# Patient Record
Sex: FEMALE | Race: WHITE | Employment: FULL TIME | URBAN - METROPOLITAN AREA
[De-identification: names, ages, dates, MRNs, and addresses within clinical notes are randomized per-mention and may not be internally consistent; named-entity substitution may affect disease eponyms.]

---

## 2017-05-17 ENCOUNTER — APPOINTMENT (OUTPATIENT)
Dept: CT IMAGING | Age: 32
End: 2017-05-17
Attending: FAMILY MEDICINE
Payer: COMMERCIAL

## 2017-05-17 ENCOUNTER — HOSPITAL ENCOUNTER (OUTPATIENT)
Age: 32
Discharge: HOME | End: 2017-05-17
Attending: FAMILY MEDICINE
Payer: COMMERCIAL

## 2017-05-17 VITALS
HEIGHT: 69 IN | WEIGHT: 240 LBS | RESPIRATION RATE: 18 BRPM | OXYGEN SATURATION: 97 % | TEMPERATURE: 98 F | BODY MASS INDEX: 35.55 KG/M2 | SYSTOLIC BLOOD PRESSURE: 117 MMHG | DIASTOLIC BLOOD PRESSURE: 73 MMHG | HEART RATE: 68 BPM

## 2017-05-17 DIAGNOSIS — R11.0 NAUSEA: ICD-10-CM

## 2017-05-17 DIAGNOSIS — R10.9 RIGHT SIDED ABDOMINAL PAIN: Primary | ICD-10-CM

## 2017-05-17 PROCEDURE — 36415 COLL VENOUS BLD VENIPUNCTURE: CPT

## 2017-05-17 PROCEDURE — 99204 OFFICE O/P NEW MOD 45 MIN: CPT

## 2017-05-17 PROCEDURE — 80047 BASIC METABLC PNL IONIZED CA: CPT

## 2017-05-17 PROCEDURE — 74177 CT ABD & PELVIS W/CONTRAST: CPT | Performed by: FAMILY MEDICINE

## 2017-05-17 PROCEDURE — 81002 URINALYSIS NONAUTO W/O SCOPE: CPT | Performed by: FAMILY MEDICINE

## 2017-05-17 PROCEDURE — 87086 URINE CULTURE/COLONY COUNT: CPT | Performed by: FAMILY MEDICINE

## 2017-05-17 PROCEDURE — 99214 OFFICE O/P EST MOD 30 MIN: CPT

## 2017-05-17 PROCEDURE — 81025 URINE PREGNANCY TEST: CPT | Performed by: FAMILY MEDICINE

## 2017-05-17 PROCEDURE — 85025 COMPLETE CBC W/AUTO DIFF WBC: CPT | Performed by: FAMILY MEDICINE

## 2017-05-17 RX ORDER — DICYCLOMINE HCL 20 MG
20 TABLET ORAL 4 TIMES DAILY PRN
Qty: 30 TABLET | Refills: 0 | Status: SHIPPED | OUTPATIENT
Start: 2017-05-17 | End: 2017-06-16

## 2017-05-17 RX ORDER — ONDANSETRON 8 MG/1
8 TABLET, ORALLY DISINTEGRATING ORAL EVERY 12 HOURS PRN
Qty: 10 TABLET | Refills: 0 | Status: SHIPPED | OUTPATIENT
Start: 2017-05-17 | End: 2017-05-27

## 2017-05-17 NOTE — ED INITIAL ASSESSMENT (HPI)
Sharp stomach pain to right side started today around 10am. Some nausea. No diarrhea, no fever. Normal BM last night.

## 2017-05-17 NOTE — ED PROVIDER NOTES
Patient Seen in: [address] [facility] [place]    History   Patient presents with:  Abdominal Pain    Stated Complaint: AB PAIN    HPI    52-year-old female presents to the clinic today with chief complaints of right upper quadrant and points to [place] Take 10 mg by mouth daily.    FREEYLE LANCETS Does not apply Misc,  As directed       Family History   Problem Relation Age of Onset   • Heart Disease Other      Grandfather   • Hypertension Other      Grandfather   • Hypertension Mother    • Diabetes Oth Normoactive bowel sounds. . Guarding : no. Rigidity: no. Percussion : Normal to percussion  RECTAL: Exam not done.   MUSCULOSKELETAL: back is not tender,FROM of the back  EXTREMITIES: no cyanosis, clubbing or edema  NEURO: Oriented times three, cranial nerve of the abdomen and pelvis.     Dictated by: Tabatha Becker MD on 5/17/2017 at 15:55     Approved by: Tabatha Becker MD           MDM     28-year-old female presenting with right-sided abdominal pain that started this morning around 10 AM.  Multiple diagno

## 2017-06-02 ENCOUNTER — LAB ENCOUNTER (OUTPATIENT)
Dept: LAB | Age: 32
End: 2017-06-02
Attending: FAMILY MEDICINE
Payer: COMMERCIAL

## 2017-06-02 DIAGNOSIS — E11.9 DIABETES MELLITUS (HCC): Primary | ICD-10-CM

## 2017-06-02 DIAGNOSIS — E78.00 PURE HYPERCHOLESTEROLEMIA: ICD-10-CM

## 2017-06-02 PROCEDURE — 84443 ASSAY THYROID STIM HORMONE: CPT

## 2017-06-02 PROCEDURE — 82043 UR ALBUMIN QUANTITATIVE: CPT

## 2017-06-02 PROCEDURE — 80061 LIPID PANEL: CPT

## 2017-06-02 PROCEDURE — 36415 COLL VENOUS BLD VENIPUNCTURE: CPT

## 2017-06-02 PROCEDURE — 80053 COMPREHEN METABOLIC PANEL: CPT

## 2017-06-02 PROCEDURE — 82570 ASSAY OF URINE CREATININE: CPT

## 2017-06-28 ENCOUNTER — TELEPHONE (OUTPATIENT)
Dept: FAMILY MEDICINE CLINIC | Facility: CLINIC | Age: 32
End: 2017-06-28

## 2017-06-28 ENCOUNTER — OFFICE VISIT (OUTPATIENT)
Dept: FAMILY MEDICINE CLINIC | Facility: CLINIC | Age: 32
End: 2017-06-28

## 2017-06-28 VITALS
TEMPERATURE: 98 F | WEIGHT: 241 LBS | BODY MASS INDEX: 36 KG/M2 | DIASTOLIC BLOOD PRESSURE: 82 MMHG | SYSTOLIC BLOOD PRESSURE: 122 MMHG

## 2017-06-28 DIAGNOSIS — H61.22 HEARING LOSS DUE TO CERUMEN IMPACTION, LEFT: Primary | ICD-10-CM

## 2017-06-28 DIAGNOSIS — H66.002 ACUTE SUPPURATIVE OTITIS MEDIA OF LEFT EAR WITHOUT SPONTANEOUS RUPTURE OF TYMPANIC MEMBRANE, RECURRENCE NOT SPECIFIED: ICD-10-CM

## 2017-06-28 DIAGNOSIS — H60.392 OTHER INFECTIVE ACUTE OTITIS EXTERNA OF LEFT EAR: ICD-10-CM

## 2017-06-28 DIAGNOSIS — E11.9 TYPE 2 DIABETES MELLITUS WITHOUT COMPLICATION, UNSPECIFIED LONG TERM INSULIN USE STATUS: ICD-10-CM

## 2017-06-28 PROBLEM — H60.502 ACUTE OTITIS EXTERNA OF LEFT EAR: Status: ACTIVE | Noted: 2017-06-28

## 2017-06-28 PROCEDURE — 69210 REMOVE IMPACTED EAR WAX UNI: CPT | Performed by: FAMILY MEDICINE

## 2017-06-28 PROCEDURE — 99213 OFFICE O/P EST LOW 20 MIN: CPT | Performed by: FAMILY MEDICINE

## 2017-06-28 RX ORDER — AMOXICILLIN AND CLAVULANATE POTASSIUM 875; 125 MG/1; MG/1
1 TABLET, FILM COATED ORAL 2 TIMES DAILY
Qty: 20 TABLET | Refills: 0 | Status: SHIPPED | OUTPATIENT
Start: 2017-06-28 | End: 2017-07-08

## 2017-06-28 RX ORDER — ACETIC ACID 20.65 MG/ML
4 SOLUTION AURICULAR (OTIC) 3 TIMES DAILY
Qty: 1 BOTTLE | Refills: 0 | Status: SHIPPED | OUTPATIENT
Start: 2017-06-28 | End: 2017-07-05

## 2017-06-28 NOTE — TELEPHONE ENCOUNTER
JOSE RAFAEL THINKS SHE HAS AN EAR INFECTION, IT HAS BEEN GOING ON FOR A WEEK, AND IT IS HER LEFT EAR. SHE WILL SEE ANYBODY IF DR MILLER CAN'T SEE HER.

## 2017-06-28 NOTE — PATIENT INSTRUCTIONS
Impacted Earwax  Impacted earwax is a buildup of the natural wax in the ear (cerumen). Impacted earwax is very common. It can cause symptoms such as hearing loss. It can also stop a doctor doing an exam of your ear.   Understanding earwax  Tiny glands in · Ringing in the ears  · Cough  Treatment for impacted earwax  If you don’t have symptoms, you may not need treatment. Often the earwax goes away on its own with time. If you have symptoms, you may have 1 or more treatments such as:  · Ear drops.  These hel You have an infection of the middle ear, the space behind the eardrum. This is also called acute otitis media (AOM). Sometimes it is caused by the common cold.  This is because congestion can block the internal passage (eustachian tube) that drains fluid fr

## 2017-06-28 NOTE — TELEPHONE ENCOUNTER
The medication Antipyrine-Benzocaine (AURALGAN) 55-14   Is no longer available. It is not produced an longer.   Please contact pharmacy to prescribe a replacement medication

## 2017-06-28 NOTE — PROGRESS NOTES
HPI:   Magy Sotelo is a 28year old female who presents for upper respiratory symptoms for  2  days. Patient reports ear pain fpr 2 days. No fever. Swimming a lot. Using Qtips. .      Current Outpatient Prescriptions:  Dulaglutide (TRULICITY) 1.5 MG/0. is a 28year old female who presents with    1. Hearing loss due to cerumen impaction, left  - cerumen removed with currette and irrigation    2.  Type 2 diabetes mellitus without complication, unspecified long term insulin use status ([facility] [address]  - followed by ALY

## 2017-06-29 ENCOUNTER — TELEPHONE (OUTPATIENT)
Dept: FAMILY MEDICINE CLINIC | Facility: CLINIC | Age: 32
End: 2017-06-29

## 2017-06-29 NOTE — TELEPHONE ENCOUNTER
Wendy Barlow from [facility] and states they received a red top aerobic culture for patient but anaerobic was ordered. Will need to speak to DS regarding this. Left message will await return phone call.

## 2017-06-30 ENCOUNTER — MED REC SCAN ONLY (OUTPATIENT)
Dept: FAMILY MEDICINE CLINIC | Facility: CLINIC | Age: 32
End: 2017-06-30

## 2017-07-12 ENCOUNTER — OFFICE VISIT (OUTPATIENT)
Dept: FAMILY MEDICINE CLINIC | Facility: CLINIC | Age: 32
End: 2017-07-12

## 2017-07-12 VITALS
TEMPERATURE: 98 F | DIASTOLIC BLOOD PRESSURE: 72 MMHG | WEIGHT: 241 LBS | BODY MASS INDEX: 36 KG/M2 | SYSTOLIC BLOOD PRESSURE: 128 MMHG

## 2017-07-12 DIAGNOSIS — H60.392 OTHER INFECTIVE ACUTE OTITIS EXTERNA OF LEFT EAR: ICD-10-CM

## 2017-07-12 DIAGNOSIS — Z86.69 OTITIS MEDIA RESOLVED: Primary | ICD-10-CM

## 2017-07-12 PROCEDURE — 87102 FUNGUS ISOLATION CULTURE: CPT | Performed by: FAMILY MEDICINE

## 2017-07-12 PROCEDURE — 99213 OFFICE O/P EST LOW 20 MIN: CPT | Performed by: FAMILY MEDICINE

## 2017-07-12 PROCEDURE — 87070 CULTURE OTHR SPECIMN AEROBIC: CPT | Performed by: FAMILY MEDICINE

## 2017-07-12 PROCEDURE — 87107 FUNGI IDENTIFICATION MOLD: CPT | Performed by: FAMILY MEDICINE

## 2017-07-12 PROCEDURE — 87205 SMEAR GRAM STAIN: CPT | Performed by: FAMILY MEDICINE

## 2017-07-12 RX ORDER — TOBRAMYCIN 3 MG/ML
SOLUTION/ DROPS OPHTHALMIC
Qty: 1 BOTTLE | Refills: 0 | Status: SHIPPED | OUTPATIENT
Start: 2017-07-12 | End: 2017-12-29

## 2017-07-12 NOTE — PATIENT INSTRUCTIONS
Otitis media resolved  Culture done  Tobramycin 3 gtt tid x 5 days  Follow up Dr. Fabien Singh 4 gtt QID x 10

## 2017-07-12 NOTE — PROGRESS NOTES
HPI:   Rozina Leong is a 28year old female who presents for follow up on ROM and otitis externa treated with augmentin and vosol ear drops.        Current Outpatient Prescriptions:  Tobramycin Sulfate 0.3 % Ophthalmic Solution 3 drops to left ear qid x RRR without murmur    ASSESSMENT AND PLAN:   Татьяна Romano is a 28year old female who presents with     Otitis media resolved  (primary encounter diagnosis)  Other infective acute otitis externa of left ear    No orders of the defined types were placed

## 2017-12-29 ENCOUNTER — OFFICE VISIT (OUTPATIENT)
Dept: FAMILY MEDICINE CLINIC | Facility: CLINIC | Age: 32
End: 2017-12-29

## 2017-12-29 VITALS
TEMPERATURE: 99 F | HEART RATE: 94 BPM | OXYGEN SATURATION: 99 % | SYSTOLIC BLOOD PRESSURE: 122 MMHG | DIASTOLIC BLOOD PRESSURE: 88 MMHG

## 2017-12-29 DIAGNOSIS — J01.00 ACUTE NON-RECURRENT MAXILLARY SINUSITIS: Primary | ICD-10-CM

## 2017-12-29 PROCEDURE — 99213 OFFICE O/P EST LOW 20 MIN: CPT | Performed by: PHYSICIAN ASSISTANT

## 2017-12-29 RX ORDER — AMOXICILLIN AND CLAVULANATE POTASSIUM 875; 125 MG/1; MG/1
1 TABLET, FILM COATED ORAL 2 TIMES DAILY
Qty: 20 TABLET | Refills: 0 | Status: SHIPPED | OUTPATIENT
Start: 2017-12-29 | End: 2018-01-08

## 2017-12-29 NOTE — PROGRESS NOTES
CHIEF COMPLAINT:   Patient presents with:  URI: x 3 days, c/o rhinorrhea, sinus pressure/HA x       HPI:   Gilda Hinton is a 33year old female who presents for upper respiratory symptoms for  3 days.  Patient reports congestion, clear/slightly yellow co GI: denies N/V/C or abdominal pain  NEURO: Denies headaches    EXAM:   /88   Pulse 94   Temp 98.6 °F (37 °C) (Oral)   LMP 12/04/2017 (Approximate)   SpO2 99%   GENERAL: well developed, well nourished,in no apparent distress  SKIN: no rashes,no suspic 1.  Encourage fluids, humidifier/vaporizor at bedside, elevate head of bed (sleep with extra pillow), vapor rub to chest, steam therapy if no fever, warm compresses for sinus pressure if no fever, salt water gargles for sore throat, lozenges for sore throa · Over-the-counter decongestants may be used unless a similar medicine was prescribed. Nasal sprays work the fastest. Use one that contains phenylephrine or oxymetazoline. First blow the nose gently. Then use the spray.  Do not use these medicines more ofte © 4485-3681 [employer] [address]. [facility] [facility] [place]. All rights reserved. This information is not intended as a substitute for professional medical care. Always follow your healthcare professional's instructions.             The

## 2018-02-11 ENCOUNTER — OFFICE VISIT (OUTPATIENT)
Dept: FAMILY MEDICINE CLINIC | Facility: CLINIC | Age: 33
End: 2018-02-11

## 2018-02-11 VITALS
HEART RATE: 97 BPM | BODY MASS INDEX: 37 KG/M2 | RESPIRATION RATE: 20 BRPM | WEIGHT: 249 LBS | DIASTOLIC BLOOD PRESSURE: 70 MMHG | TEMPERATURE: 98 F | OXYGEN SATURATION: 95 % | SYSTOLIC BLOOD PRESSURE: 122 MMHG

## 2018-02-11 DIAGNOSIS — J40 BRONCHITIS: Primary | ICD-10-CM

## 2018-02-11 DIAGNOSIS — Z87.09 H/O INFLUENZA: ICD-10-CM

## 2018-02-11 PROCEDURE — 99213 OFFICE O/P EST LOW 20 MIN: CPT | Performed by: NURSE PRACTITIONER

## 2018-02-11 RX ORDER — CODEINE PHOSPHATE AND GUAIFENESIN 10; 100 MG/5ML; MG/5ML
10 SOLUTION ORAL EVERY 6 HOURS PRN
Qty: 120 ML | Refills: 0 | Status: SHIPPED | OUTPATIENT
Start: 2018-02-11 | End: 2018-02-16 | Stop reason: ALTCHOICE

## 2018-02-11 RX ORDER — PREDNISONE 20 MG/1
20 TABLET ORAL DAILY
Qty: 5 TABLET | Refills: 0 | Status: SHIPPED | OUTPATIENT
Start: 2018-02-11 | End: 2018-02-16 | Stop reason: ALTCHOICE

## 2018-02-11 NOTE — PROGRESS NOTES
CHIEF COMPLAINT:   Patient presents with:  Flu      HPI:   Mitesh Coombs is a 33year old female who presents for sudden onset of flu-like symptoms. Symptoms began 4 days ago.   Patient reports body aches, chills,  sore throat only at the beginning of sx' GENERAL: well developed, well nourished,in no apparent distress  SKIN: no rashes,no suspicious lesions  HEAD: atraumatic, normocephalic.  no tenderness on palpation of sinuses  EYES: conjunctiva clear, EOM intact  EARS: TM's intact, no bulging, noretractio You have a viral bronchitis. Bronchitis is inflammation and swelling of the lining of the lungs. This is often caused by an infection. Symptoms include a dry, hacking cough that is worse at night. The cough may bring up yellow-green mucus.  You may also fee · Over-the-counter cough, cold, and sore-throat medicines will not shorten the length of the illness, but they may help to reduce symptoms. Don't use decongestants if you have high blood pressure.   Follow-up care  Follow up with your healthcare provider, o

## 2018-02-15 ENCOUNTER — TELEPHONE (OUTPATIENT)
Dept: FAMILY MEDICINE CLINIC | Facility: CLINIC | Age: 33
End: 2018-02-15

## 2018-02-15 NOTE — TELEPHONE ENCOUNTER
WENT TO THE WALK IN CLINIC IN [place] [address] AND IS THROUGH TAKING THE MEDICINE AND STILL IS SICK.  SHE WANTS TO SEE DR Mina Moya TOMORROW

## 2018-02-15 NOTE — TELEPHONE ENCOUNTER
Was seen Saturday @ walk in clinic. Cough persists \" hacking all day\", she's a . Has finished the prednisone, and the cough syrup. Wondering if there is something else she can do.  Denies fever, was told @ walk in clinic, they assume she act

## 2018-02-15 NOTE — TELEPHONE ENCOUNTER
Needs to see Dr Avelina Block; cough from flu can last 10 days she is on day 7, steroids make blood sugars high, I think reassessment is her best bet.

## 2018-02-16 ENCOUNTER — OFFICE VISIT (OUTPATIENT)
Dept: FAMILY MEDICINE CLINIC | Facility: CLINIC | Age: 33
End: 2018-02-16

## 2018-02-16 VITALS
HEIGHT: 67.75 IN | TEMPERATURE: 99 F | SYSTOLIC BLOOD PRESSURE: 126 MMHG | BODY MASS INDEX: 38.56 KG/M2 | DIASTOLIC BLOOD PRESSURE: 80 MMHG | WEIGHT: 251.5 LBS

## 2018-02-16 DIAGNOSIS — J01.00 ACUTE NON-RECURRENT MAXILLARY SINUSITIS: Primary | ICD-10-CM

## 2018-02-16 DIAGNOSIS — J20.9 ACUTE BRONCHITIS WITH BRONCHOSPASM: ICD-10-CM

## 2018-02-16 DIAGNOSIS — E11.9 TYPE 2 DIABETES MELLITUS WITHOUT COMPLICATION, UNSPECIFIED LONG TERM INSULIN USE STATUS: ICD-10-CM

## 2018-02-16 PROCEDURE — 99213 OFFICE O/P EST LOW 20 MIN: CPT | Performed by: FAMILY MEDICINE

## 2018-02-16 RX ORDER — AZITHROMYCIN 250 MG/1
TABLET, FILM COATED ORAL
Qty: 6 TABLET | Refills: 0 | Status: SHIPPED | OUTPATIENT
Start: 2018-02-16 | End: 2018-02-21 | Stop reason: ALTCHOICE

## 2018-02-16 RX ORDER — AMOXICILLIN 875 MG/1
875 TABLET, COATED ORAL 2 TIMES DAILY
Qty: 20 TABLET | Refills: 0 | Status: CANCELLED | OUTPATIENT
Start: 2018-02-16

## 2018-02-16 RX ORDER — ALBUTEROL SULFATE 90 UG/1
2 AEROSOL, METERED RESPIRATORY (INHALATION) EVERY 6 HOURS PRN
Qty: 1 INHALER | Refills: 0 | Status: SHIPPED | OUTPATIENT
Start: 2018-02-16 | End: 2018-07-18

## 2018-02-16 NOTE — PROGRESS NOTES
HPI:   Darren Rosado is a 33year old female who presents for upper respiratory symptoms. Seen in UC 3 days ago started 1 week ago. With fever, chills. Temp to 101. Children sick to and improved.   Getting more chest congestion, and now lots of nasal roxy well developed, well nourished,in no apparent distress  SKIN: no rashes,no suspicious lesions  EYES:conjunctiva are clear  HEENT: ears and throat are clear  NECK: supple,no adenopathy  LUNGS: wheeze with cough  CARDIO: RRR without murmur  GI: good BS's,no

## 2018-02-21 ENCOUNTER — APPOINTMENT (OUTPATIENT)
Dept: GENERAL RADIOLOGY | Age: 33
End: 2018-02-21
Attending: FAMILY MEDICINE
Payer: COMMERCIAL

## 2018-02-21 ENCOUNTER — HOSPITAL ENCOUNTER (OUTPATIENT)
Age: 33
Discharge: HOME | End: 2018-02-21
Attending: FAMILY MEDICINE
Payer: COMMERCIAL

## 2018-02-21 VITALS
BODY MASS INDEX: 38 KG/M2 | DIASTOLIC BLOOD PRESSURE: 80 MMHG | TEMPERATURE: 98 F | WEIGHT: 245 LBS | OXYGEN SATURATION: 96 % | RESPIRATION RATE: 20 BRPM | HEART RATE: 110 BPM | SYSTOLIC BLOOD PRESSURE: 135 MMHG

## 2018-02-21 DIAGNOSIS — J40 BRONCHITIS: Primary | ICD-10-CM

## 2018-02-21 PROCEDURE — 99214 OFFICE O/P EST MOD 30 MIN: CPT

## 2018-02-21 PROCEDURE — 94640 AIRWAY INHALATION TREATMENT: CPT

## 2018-02-21 PROCEDURE — 71046 X-RAY EXAM CHEST 2 VIEWS: CPT | Performed by: FAMILY MEDICINE

## 2018-02-21 RX ORDER — IPRATROPIUM BROMIDE AND ALBUTEROL SULFATE 2.5; .5 MG/3ML; MG/3ML
3 SOLUTION RESPIRATORY (INHALATION) ONCE
Status: COMPLETED | OUTPATIENT
Start: 2018-02-21 | End: 2018-02-21

## 2018-02-21 RX ORDER — ALBUTEROL SULFATE 2.5 MG/3ML
2.5 SOLUTION RESPIRATORY (INHALATION) ONCE
Status: COMPLETED | OUTPATIENT
Start: 2018-02-21 | End: 2018-02-21

## 2018-02-21 RX ORDER — ALBUTEROL SULFATE 2.5 MG/3ML
2.5 SOLUTION RESPIRATORY (INHALATION) EVERY 4 HOURS PRN
Qty: 30 AMPULE | Refills: 0 | Status: SHIPPED | OUTPATIENT
Start: 2018-02-21 | End: 2018-03-23

## 2018-02-21 RX ORDER — AMOXICILLIN AND CLAVULANATE POTASSIUM 875; 125 MG/1; MG/1
1 TABLET, FILM COATED ORAL 2 TIMES DAILY
Qty: 20 TABLET | Refills: 0 | Status: SHIPPED | OUTPATIENT
Start: 2018-02-21 | End: 2018-03-03

## 2018-02-21 RX ORDER — PREDNISONE 20 MG/1
TABLET ORAL
Qty: 19 TABLET | Refills: 0 | Status: SHIPPED | OUTPATIENT
Start: 2018-02-21 | End: 2018-07-18 | Stop reason: ALTCHOICE

## 2018-02-21 NOTE — ED INITIAL ASSESSMENT (HPI)
Pt has had a bad cough for 2 weeks,  Was given prednisone inhalers and has now finished a  a zpack. She is continuing with SOB, even doing small things, and is extremely fatigued. She is not getting better.

## 2018-02-21 NOTE — ED PROVIDER NOTES
Patient Seen in: [facility] [place]    History   Patient presents with:  Dyspnea JOSEPH SOB (respiratory)  Sports Physical    Stated Complaint: cough/sob    HPI    33year old female presents for cough, congestion and shortness of breath.  State clear and moist.   Eyes: Conjunctivae and EOM are normal. Pupils are equal, round, and reactive to light. Neck: Normal range of motion. Neck supple. Cardiovascular: Normal rate, regular rhythm, normal heart sounds and intact distal pulses.     Pulmonary Normal, Disp-19 tablet, R-0    Nebulizer Does not apply Misc  Use every 4hrs as needed, Print, Disp-1 each, R-0    albuterol sulfate (2.5 MG/3ML) 0.083% Inhalation Nebu Soln  Take 3 mL (2.5 mg total) by nebulization every 4 (four) hours as needed for Bear Ripley

## 2018-02-23 ENCOUNTER — TELEPHONE (OUTPATIENT)
Dept: FAMILY MEDICINE CLINIC | Facility: CLINIC | Age: 33
End: 2018-02-23

## 2018-06-01 ENCOUNTER — HOSPITAL ENCOUNTER (OUTPATIENT)
Age: 33
Discharge: HOME | End: 2018-06-01
Attending: FAMILY MEDICINE
Payer: COMMERCIAL

## 2018-06-01 ENCOUNTER — APPOINTMENT (OUTPATIENT)
Dept: GENERAL RADIOLOGY | Age: 33
End: 2018-06-01
Attending: FAMILY MEDICINE
Payer: COMMERCIAL

## 2018-06-01 VITALS
RESPIRATION RATE: 20 BRPM | BODY MASS INDEX: 35.55 KG/M2 | OXYGEN SATURATION: 98 % | WEIGHT: 240 LBS | HEART RATE: 88 BPM | HEIGHT: 69 IN | TEMPERATURE: 100 F | SYSTOLIC BLOOD PRESSURE: 132 MMHG | DIASTOLIC BLOOD PRESSURE: 84 MMHG

## 2018-06-01 DIAGNOSIS — M25.562 ACUTE PAIN OF LEFT KNEE: Primary | ICD-10-CM

## 2018-06-01 PROCEDURE — 73562 X-RAY EXAM OF KNEE 3: CPT | Performed by: FAMILY MEDICINE

## 2018-06-01 PROCEDURE — 99213 OFFICE O/P EST LOW 20 MIN: CPT

## 2018-06-01 NOTE — ED INITIAL ASSESSMENT (HPI)
Yesterday jumping and when came down on left knee, felt and heard a pop sound. Since then pain to left knee, worse with ambulation. Patient states feels like left knee joint 'locks' up at times. Patient is wearing knee brace at present.

## 2018-06-01 NOTE — ED PROVIDER NOTES
Patient Seen in: [address] [facility]    History   Patient presents with:  Lower Extremity Injury (musculoskeletal)    Stated Complaint: KNEE PAIN      20-year-old female comes in with complaints of having left knee pain since after she jumped 97.9 °F (36.6 °C)  Temp src: Temporal  SpO2: 100 %  O2 Device: None (Room air)    Current:/84   Pulse 72   Temp 97.9 °F (36.6 °C) (Temporal)   Resp 16   Ht 175.3 cm (5' 9\")   Wt 108.9 kg   LMP 05/24/2018   SpO2 100%   BMI 35.44 kg/m²         Physica as of Jun 01 1830  ------------------------------------------------------------      MDM       X-ray is reported to show -    FINDINGS:    BONES:  Normal.  No significant arthropathy or acute abnormality.   SOFT TISSUES:  Negative.  No visible soft tissue s

## 2018-06-08 ENCOUNTER — MED REC SCAN ONLY (OUTPATIENT)
Dept: FAMILY MEDICINE CLINIC | Facility: CLINIC | Age: 33
End: 2018-06-08

## 2018-07-09 ENCOUNTER — LABORATORY ENCOUNTER (OUTPATIENT)
Dept: LAB | Age: 33
End: 2018-07-09
Attending: FAMILY MEDICINE
Payer: COMMERCIAL

## 2018-07-09 DIAGNOSIS — I10 ESSENTIAL HYPERTENSION: ICD-10-CM

## 2018-07-09 DIAGNOSIS — E11.9 DIABETES MELLITUS (HCC): Primary | ICD-10-CM

## 2018-07-09 LAB
ALBUMIN SERPL-MCNC: 4 G/DL (ref 3.5–4.8)
ALP LIVER SERPL-CCNC: 67 U/L (ref 37–98)
ALT SERPL-CCNC: 27 U/L (ref 14–54)
AST SERPL-CCNC: 17 U/L (ref 15–41)
BILIRUB SERPL-MCNC: 0.7 MG/DL (ref 0.1–2)
BUN BLD-MCNC: 13 MG/DL (ref 8–20)
CALCIUM BLD-MCNC: 8.7 MG/DL (ref 8.3–10.3)
CHLORIDE: 107 MMOL/L (ref 101–111)
CHOLEST SMN-MCNC: 158 MG/DL (ref ?–200)
CO2: 27 MMOL/L (ref 22–32)
CREAT BLD-MCNC: 0.97 MG/DL (ref 0.55–1.02)
CREAT UR-SCNC: 179 MG/DL
GLUCOSE BLD-MCNC: 121 MG/DL (ref 70–99)
HDLC SERPL-MCNC: 41 MG/DL (ref 45–?)
HDLC SERPL: 3.85 {RATIO} (ref ?–4.44)
LDLC SERPL CALC-MCNC: 91 MG/DL (ref ?–130)
M PROTEIN MFR SERPL ELPH: 7.4 G/DL (ref 6.1–8.3)
MICROALBUMIN UR-MCNC: 0.87 MG/DL
MICROALBUMIN/CREAT 24H UR-RTO: 4.9 UG/MG (ref ?–30)
NONHDLC SERPL-MCNC: 117 MG/DL (ref ?–130)
POTASSIUM SERPL-SCNC: 4 MMOL/L (ref 3.6–5.1)
SODIUM SERPL-SCNC: 140 MMOL/L (ref 136–144)
TRIGL SERPL-MCNC: 129 MG/DL (ref ?–150)
TSI SER-ACNC: 1.08 MIU/ML (ref 0.35–5.5)
VLDLC SERPL CALC-MCNC: 26 MG/DL (ref 5–40)

## 2018-07-09 PROCEDURE — 82570 ASSAY OF URINE CREATININE: CPT | Performed by: FAMILY MEDICINE

## 2018-07-09 PROCEDURE — 84443 ASSAY THYROID STIM HORMONE: CPT | Performed by: FAMILY MEDICINE

## 2018-07-09 PROCEDURE — 36415 COLL VENOUS BLD VENIPUNCTURE: CPT | Performed by: FAMILY MEDICINE

## 2018-07-09 PROCEDURE — 82043 UR ALBUMIN QUANTITATIVE: CPT | Performed by: FAMILY MEDICINE

## 2018-07-09 PROCEDURE — 80061 LIPID PANEL: CPT | Performed by: FAMILY MEDICINE

## 2018-07-09 PROCEDURE — 80053 COMPREHEN METABOLIC PANEL: CPT | Performed by: FAMILY MEDICINE

## 2018-07-10 ENCOUNTER — PATIENT OUTREACH (OUTPATIENT)
Dept: FAMILY MEDICINE CLINIC | Facility: CLINIC | Age: 33
End: 2018-07-10

## 2018-07-18 ENCOUNTER — OFFICE VISIT (OUTPATIENT)
Dept: FAMILY MEDICINE CLINIC | Facility: CLINIC | Age: 33
End: 2018-07-18
Payer: COMMERCIAL

## 2018-07-18 ENCOUNTER — LAB ENCOUNTER (OUTPATIENT)
Dept: LAB | Age: 33
End: 2018-07-18
Attending: FAMILY MEDICINE
Payer: COMMERCIAL

## 2018-07-18 ENCOUNTER — MED REC SCAN ONLY (OUTPATIENT)
Dept: FAMILY MEDICINE CLINIC | Facility: CLINIC | Age: 33
End: 2018-07-18

## 2018-07-18 VITALS
RESPIRATION RATE: 16 BRPM | TEMPERATURE: 98 F | BODY MASS INDEX: 39.12 KG/M2 | HEIGHT: 67.75 IN | DIASTOLIC BLOOD PRESSURE: 80 MMHG | SYSTOLIC BLOOD PRESSURE: 110 MMHG | WEIGHT: 255.13 LBS | HEART RATE: 88 BPM

## 2018-07-18 DIAGNOSIS — Z01.818 PREOP EXAMINATION: ICD-10-CM

## 2018-07-18 DIAGNOSIS — E11.9 TYPE 2 DIABETES MELLITUS WITHOUT COMPLICATION, UNSPECIFIED WHETHER LONG TERM INSULIN USE (HCC): ICD-10-CM

## 2018-07-18 DIAGNOSIS — S83.512D RUPTURE OF ANTERIOR CRUCIATE LIGAMENT OF LEFT KNEE, SUBSEQUENT ENCOUNTER: Primary | ICD-10-CM

## 2018-07-18 PROBLEM — H66.002 ACUTE SUPPURATIVE OTITIS MEDIA OF LEFT EAR WITHOUT SPONTANEOUS RUPTURE OF TYMPANIC MEMBRANE: Status: RESOLVED | Noted: 2017-06-28 | Resolved: 2018-07-18

## 2018-07-18 PROBLEM — S83.512A LEFT ANTERIOR CRUCIATE LIGAMENT TEAR: Status: ACTIVE | Noted: 2018-07-18

## 2018-07-18 PROBLEM — H60.502 ACUTE OTITIS EXTERNA OF LEFT EAR: Status: RESOLVED | Noted: 2017-06-28 | Resolved: 2018-07-18

## 2018-07-18 PROBLEM — S83.511A RIGHT ACL TEAR: Status: ACTIVE | Noted: 2018-07-18

## 2018-07-18 LAB
APTT PPP: 32.2 SECONDS (ref 26.1–34.6)
BASOPHILS # BLD AUTO: 0.03 X10(3) UL (ref 0–0.1)
BASOPHILS NFR BLD AUTO: 0.4 %
EOSINOPHIL # BLD AUTO: 0.21 X10(3) UL (ref 0–0.3)
EOSINOPHIL NFR BLD AUTO: 2.9 %
ERYTHROCYTE [DISTWIDTH] IN BLOOD BY AUTOMATED COUNT: 12.5 % (ref 11.5–16)
HCT VFR BLD AUTO: 40.6 % (ref 34–50)
HGB BLD-MCNC: 13 G/DL (ref 12–16)
IMMATURE GRANULOCYTE COUNT: 0.03 X10(3) UL (ref 0–1)
IMMATURE GRANULOCYTE RATIO %: 0.4 %
INR BLD: 0.93 (ref 0.9–1.1)
LYMPHOCYTES # BLD AUTO: 2.2 X10(3) UL (ref 0.9–4)
LYMPHOCYTES NFR BLD AUTO: 30.6 %
MCH RBC QN AUTO: 28.4 PG (ref 27–33.2)
MCHC RBC AUTO-ENTMCNC: 32 G/DL (ref 31–37)
MCV RBC AUTO: 88.8 FL (ref 81–100)
MONOCYTES # BLD AUTO: 0.51 X10(3) UL (ref 0.1–1)
MONOCYTES NFR BLD AUTO: 7.1 %
NEUTROPHIL ABS PRELIM: 4.2 X10 (3) UL (ref 1.3–6.7)
NEUTROPHILS # BLD AUTO: 4.2 X10(3) UL (ref 1.3–6.7)
NEUTROPHILS NFR BLD AUTO: 58.6 %
PLATELET # BLD AUTO: 299 10(3)UL (ref 150–450)
PSA SERPL DL<=0.01 NG/ML-MCNC: 12.9 SECONDS (ref 12.4–14.7)
RBC # BLD AUTO: 4.57 X10(6)UL (ref 3.8–5.1)
RED CELL DISTRIBUTION WIDTH-SD: 40.3 FL (ref 35.1–46.3)
WBC # BLD AUTO: 7.2 X10(3) UL (ref 4–13)

## 2018-07-18 PROCEDURE — 36415 COLL VENOUS BLD VENIPUNCTURE: CPT | Performed by: FAMILY MEDICINE

## 2018-07-18 PROCEDURE — 85025 COMPLETE CBC W/AUTO DIFF WBC: CPT | Performed by: FAMILY MEDICINE

## 2018-07-18 PROCEDURE — 85730 THROMBOPLASTIN TIME PARTIAL: CPT | Performed by: FAMILY MEDICINE

## 2018-07-18 PROCEDURE — 85610 PROTHROMBIN TIME: CPT | Performed by: FAMILY MEDICINE

## 2018-07-18 PROCEDURE — 99214 OFFICE O/P EST MOD 30 MIN: CPT | Performed by: FAMILY MEDICINE

## 2018-07-18 NOTE — PROGRESS NOTES
Joaquina Brvao is a 33year old female who presents for a pre-operative physical exam. Patient is to have Left ACL reconstruction, to be done by Dr. Sd Valiente at [facility]  on 7/25/2018. HPI:   Pt complains of needing labs.  Had diabetic labs done 7/10/2018 rec vision or double vision  HEENT: denies nasal congestion, sinus pain or ST  LUNGS: denies shortness of breath with exertion  CARDIOVASCULAR: denies chest pain on exertion  GI: denies abdominal pain,denies heartburn  : denies dysuria, vaginal discharge or 2.0 mg/dL Final   • Total Protein 07/09/2018 7.4  6.1 - 8.3 g/dL Final   • Albumin 07/09/2018 4.0  3.5 - 4.8 g/dL Final   • Sodium 07/09/2018 140  136 - 144 mmol/L Final   • Potassium 07/09/2018 4.0  3.6 - 5.1 mmol/L Final   • Chloride 07/09/2018 107  101

## 2018-07-18 NOTE — PATIENT INSTRUCTIONS
Surgery for Anterior Cruciate Ligament (ACL) Injury  The ACL (anterior cruciate ligament) is a band of tough, fibrous tissue that helps stabilize the knee. Injury to this ligament often happens when the knee is forced beyond its normal range of motion.  Cherri Blanco Here is what to expect:  · You’ll spend a few hours in a recovery area. You’ll have ice on your knee to prevent swelling, and your leg may be in a brace. · You may get a continuous cooling machine to relieve swelling and pain.   · You may get medicines to

## 2018-10-30 LAB
AMB EXT HGBA1C: 6.3 %
AMB EXT HGBA1C: 6.3 %

## 2019-03-06 ENCOUNTER — TELEPHONE (OUTPATIENT)
Dept: FAMILY MEDICINE CLINIC | Facility: CLINIC | Age: 34
End: 2019-03-06

## 2019-03-06 NOTE — TELEPHONE ENCOUNTER
States she still sees Dr. Abebe Spain. Has endo- Dr. Roberth Kulkarni. Saw him in the fall. Has not had eye exam.    Last A1c abstracted from [facility].

## 2019-05-01 ENCOUNTER — OFFICE VISIT (OUTPATIENT)
Dept: FAMILY MEDICINE CLINIC | Facility: CLINIC | Age: 34
End: 2019-05-01
Payer: COMMERCIAL

## 2019-05-01 ENCOUNTER — LABORATORY ENCOUNTER (OUTPATIENT)
Dept: LAB | Age: 34
End: 2019-05-01
Attending: FAMILY MEDICINE
Payer: COMMERCIAL

## 2019-05-01 VITALS
HEIGHT: 68 IN | WEIGHT: 252 LBS | TEMPERATURE: 98 F | DIASTOLIC BLOOD PRESSURE: 68 MMHG | BODY MASS INDEX: 38.19 KG/M2 | RESPIRATION RATE: 16 BRPM | HEART RATE: 86 BPM | SYSTOLIC BLOOD PRESSURE: 110 MMHG

## 2019-05-01 DIAGNOSIS — E78.00 PURE HYPERCHOLESTEROLEMIA: ICD-10-CM

## 2019-05-01 DIAGNOSIS — I10 ESSENTIAL HYPERTENSION: ICD-10-CM

## 2019-05-01 DIAGNOSIS — Z11.1 SCREENING FOR TUBERCULOSIS: ICD-10-CM

## 2019-05-01 DIAGNOSIS — Z23 NEED FOR VACCINATION: ICD-10-CM

## 2019-05-01 DIAGNOSIS — E11.9 TYPE 2 DIABETES MELLITUS WITHOUT COMPLICATION, WITHOUT LONG-TERM CURRENT USE OF INSULIN (HCC): Primary | ICD-10-CM

## 2019-05-01 DIAGNOSIS — Z00.00 ROUTINE HEALTH MAINTENANCE: Primary | ICD-10-CM

## 2019-05-01 DIAGNOSIS — E11.9 TYPE 2 DIABETES MELLITUS WITHOUT COMPLICATION, UNSPECIFIED WHETHER LONG TERM INSULIN USE (HCC): ICD-10-CM

## 2019-05-01 PROCEDURE — 36415 COLL VENOUS BLD VENIPUNCTURE: CPT | Performed by: FAMILY MEDICINE

## 2019-05-01 PROCEDURE — 84443 ASSAY THYROID STIM HORMONE: CPT | Performed by: FAMILY MEDICINE

## 2019-05-01 PROCEDURE — 86580 TB INTRADERMAL TEST: CPT | Performed by: FAMILY MEDICINE

## 2019-05-01 PROCEDURE — 80053 COMPREHEN METABOLIC PANEL: CPT | Performed by: FAMILY MEDICINE

## 2019-05-01 PROCEDURE — 90471 IMMUNIZATION ADMIN: CPT | Performed by: FAMILY MEDICINE

## 2019-05-01 PROCEDURE — 82043 UR ALBUMIN QUANTITATIVE: CPT | Performed by: FAMILY MEDICINE

## 2019-05-01 PROCEDURE — 80061 LIPID PANEL: CPT | Performed by: FAMILY MEDICINE

## 2019-05-01 PROCEDURE — 82570 ASSAY OF URINE CREATININE: CPT | Performed by: FAMILY MEDICINE

## 2019-05-01 PROCEDURE — 99395 PREV VISIT EST AGE 18-39: CPT | Performed by: FAMILY MEDICINE

## 2019-05-01 PROCEDURE — 90715 TDAP VACCINE 7 YRS/> IM: CPT | Performed by: FAMILY MEDICINE

## 2019-05-01 NOTE — H&P
HPI:   David Crawford is a 29year old female who presents for a complete physical exam. Symptoms: periods are regular. Patient complains of needing work physical. Due for labs. Sees Dr. Jose Manuel James for her DM. Next week. Will be going to diabetic educator. Rfl:    MetFORMIN HCl (GLUCOPHAGE) 1000 MG Oral Tab Take 1,000 mg by mouth 2 (two) times daily with meals. Disp:  Rfl:    Atorvastatin Calcium (LIPITOR) 10 MG Oral Tab Take 10 mg by mouth daily.  Disp:  Rfl: 3   FREESTYLE LANCETS Does not apply Misc As dir disk,conjunctiva are clear  NECK: supple,no adenopathy,no bruits  CHEST: no chest tenderness  LUNGS: clear to auscultation  CARDIO: RRR without murmur  GI: good BS's,no masses, HSM or tenderness masses or tenderness  MUSCULOSKELETAL: back is not tender,FRO

## 2019-05-04 ENCOUNTER — NURSE ONLY (OUTPATIENT)
Dept: FAMILY MEDICINE CLINIC | Facility: CLINIC | Age: 34
End: 2019-05-04
Payer: COMMERCIAL

## 2019-05-04 DIAGNOSIS — Z23 NEED FOR VACCINATION: Primary | ICD-10-CM

## 2019-12-04 ENCOUNTER — DOCUMENTATION ONLY (OUTPATIENT)
Dept: FAMILY MEDICINE CLINIC | Facility: CLINIC | Age: 34
End: 2019-12-04

## 2020-02-04 ENCOUNTER — OFFICE VISIT (OUTPATIENT)
Dept: FAMILY MEDICINE CLINIC | Facility: CLINIC | Age: 35
End: 2020-02-04
Payer: COMMERCIAL

## 2020-02-04 VITALS
SYSTOLIC BLOOD PRESSURE: 112 MMHG | DIASTOLIC BLOOD PRESSURE: 64 MMHG | WEIGHT: 248 LBS | TEMPERATURE: 100 F | OXYGEN SATURATION: 97 % | BODY MASS INDEX: 38 KG/M2 | RESPIRATION RATE: 16 BRPM | HEART RATE: 97 BPM

## 2020-02-04 DIAGNOSIS — J11.1 INFLUENZA: Primary | ICD-10-CM

## 2020-02-04 PROCEDURE — 99213 OFFICE O/P EST LOW 20 MIN: CPT | Performed by: NURSE PRACTITIONER

## 2020-02-04 RX ORDER — ATORVASTATIN CALCIUM 20 MG/1
TABLET, FILM COATED ORAL
COMMUNITY
Start: 2019-11-04

## 2020-02-05 NOTE — PROGRESS NOTES
CHIEF COMPLAINT:   Patient presents with:  Cough: fever/body aches x last night. max temp 102. no congestion      HPI:   Darren Rosado is a 36year old female who presents for upper respiratory symptoms for  1 days.  Patient reports cough, body aches, fev EYES: conjunctiva clear, EOM intact  EARS: TM's gray, no bulging, no retraction,no fluid, bony landmarks visible  NOSE: Nostrils patent, clear nasal discharge, nasal mucosa red   THROAT: Oral mucosa pink, moist. Posterior pharynx is non erythematous.  no ex The flu starts 1 to 3 days after you are exposed to the flu virus. It may last for 1 to 2 weeks but many people feel tired or fatigued for many weeks afterward. You usually don’t need to take antibiotics unless you have a complication.  This might be an ear · Cough with lots of colored mucus (sputum) or blood in your mucus  · Chest pain, shortness of breath, wheezing, or trouble breathing  · Severe headache, or face, neck, or ear pain  · New rash with fever  · Fever of 100.4°F (38°C) or higher, or as directed

## 2020-07-23 ENCOUNTER — LABORATORY ENCOUNTER (OUTPATIENT)
Dept: LAB | Age: 35
End: 2020-07-23
Attending: FAMILY MEDICINE
Payer: COMMERCIAL

## 2020-07-23 DIAGNOSIS — E78.00 PURE HYPERCHOLESTEROLEMIA: Primary | ICD-10-CM

## 2020-07-23 LAB
CHOLEST SMN-MCNC: 252 MG/DL (ref ?–200)
HDLC SERPL-MCNC: 52 MG/DL (ref 40–59)
LDLC SERPL CALC-MCNC: 172 MG/DL (ref ?–100)
NONHDLC SERPL-MCNC: 200 MG/DL (ref ?–130)
PATIENT FASTING Y/N/NP: YES
TRIGL SERPL-MCNC: 138 MG/DL (ref 30–149)
VLDLC SERPL CALC-MCNC: 28 MG/DL (ref 0–30)

## 2020-07-23 PROCEDURE — 80061 LIPID PANEL: CPT

## 2020-07-23 PROCEDURE — 36415 COLL VENOUS BLD VENIPUNCTURE: CPT

## 2020-08-10 ENCOUNTER — LABORATORY ENCOUNTER (OUTPATIENT)
Dept: LAB | Age: 35
End: 2020-08-10
Attending: INTERNAL MEDICINE
Payer: COMMERCIAL

## 2020-08-10 DIAGNOSIS — E11.9 TYPE 2 DIABETES MELLITUS WITHOUT COMPLICATION, WITHOUT LONG-TERM CURRENT USE OF INSULIN (HCC): Primary | ICD-10-CM

## 2020-08-10 LAB
EST. AVERAGE GLUCOSE BLD GHB EST-MCNC: 151 MG/DL (ref 68–126)
HBA1C MFR BLD HPLC: 6.9 % (ref ?–5.7)

## 2020-08-10 PROCEDURE — 36415 COLL VENOUS BLD VENIPUNCTURE: CPT

## 2020-08-10 PROCEDURE — 83036 HEMOGLOBIN GLYCOSYLATED A1C: CPT

## 2020-12-12 ENCOUNTER — NURSE ONLY (OUTPATIENT)
Dept: FAMILY MEDICINE CLINIC | Facility: CLINIC | Age: 35
End: 2020-12-12
Payer: COMMERCIAL

## 2020-12-12 DIAGNOSIS — E11.9 TYPE 2 DIABETES MELLITUS WITHOUT COMPLICATION, WITHOUT LONG-TERM CURRENT USE OF INSULIN (HCC): ICD-10-CM

## 2020-12-12 DIAGNOSIS — Z51.81 ENCOUNTER FOR THERAPEUTIC DRUG MONITORING: ICD-10-CM

## 2020-12-12 DIAGNOSIS — E78.00 PURE HYPERCHOLESTEROLEMIA: Primary | ICD-10-CM

## 2020-12-12 PROCEDURE — 84443 ASSAY THYROID STIM HORMONE: CPT | Performed by: FAMILY MEDICINE

## 2020-12-12 PROCEDURE — 80053 COMPREHEN METABOLIC PANEL: CPT | Performed by: FAMILY MEDICINE

## 2020-12-12 PROCEDURE — 80061 LIPID PANEL: CPT | Performed by: FAMILY MEDICINE

## 2020-12-12 PROCEDURE — 82043 UR ALBUMIN QUANTITATIVE: CPT | Performed by: FAMILY MEDICINE

## 2020-12-12 PROCEDURE — 82570 ASSAY OF URINE CREATININE: CPT | Performed by: FAMILY MEDICINE

## 2020-12-12 PROCEDURE — 36415 COLL VENOUS BLD VENIPUNCTURE: CPT | Performed by: FAMILY MEDICINE

## 2021-02-15 ENCOUNTER — OFFICE VISIT (OUTPATIENT)
Dept: FAMILY MEDICINE CLINIC | Facility: CLINIC | Age: 36
End: 2021-02-15
Payer: COMMERCIAL

## 2021-02-15 VITALS
RESPIRATION RATE: 15 BRPM | HEART RATE: 103 BPM | SYSTOLIC BLOOD PRESSURE: 130 MMHG | BODY MASS INDEX: 34.07 KG/M2 | DIASTOLIC BLOOD PRESSURE: 80 MMHG | OXYGEN SATURATION: 99 % | WEIGHT: 230 LBS | TEMPERATURE: 99 F | HEIGHT: 69 IN

## 2021-02-15 DIAGNOSIS — R39.9 UTI SYMPTOMS: Primary | ICD-10-CM

## 2021-02-15 LAB
MULTISTIX LOT#: 5077 NUMERIC
PH, URINE: 5.5 (ref 4.5–8)
SPECIFIC GRAVITY: 1.03 (ref 1–1.03)
UROBILINOGEN,SEMI-QN: 1 MG/DL (ref 0–1.9)

## 2021-02-15 PROCEDURE — 3075F SYST BP GE 130 - 139MM HG: CPT | Performed by: PHYSICIAN ASSISTANT

## 2021-02-15 PROCEDURE — 87086 URINE CULTURE/COLONY COUNT: CPT | Performed by: PHYSICIAN ASSISTANT

## 2021-02-15 PROCEDURE — 81003 URINALYSIS AUTO W/O SCOPE: CPT | Performed by: PHYSICIAN ASSISTANT

## 2021-02-15 PROCEDURE — 99213 OFFICE O/P EST LOW 20 MIN: CPT | Performed by: PHYSICIAN ASSISTANT

## 2021-02-15 PROCEDURE — 3079F DIAST BP 80-89 MM HG: CPT | Performed by: PHYSICIAN ASSISTANT

## 2021-02-15 PROCEDURE — 87088 URINE BACTERIA CULTURE: CPT | Performed by: PHYSICIAN ASSISTANT

## 2021-02-15 PROCEDURE — 3008F BODY MASS INDEX DOCD: CPT | Performed by: PHYSICIAN ASSISTANT

## 2021-02-15 PROCEDURE — 87186 SC STD MICRODIL/AGAR DIL: CPT | Performed by: PHYSICIAN ASSISTANT

## 2021-02-15 RX ORDER — NITROFURANTOIN 25; 75 MG/1; MG/1
100 CAPSULE ORAL 2 TIMES DAILY
Qty: 14 CAPSULE | Refills: 0 | Status: SHIPPED | OUTPATIENT
Start: 2021-02-15 | End: 2021-02-22

## 2021-02-16 NOTE — PROGRESS NOTES
CHIEF COMPLAINT:   Patient presents with:  UTI      HPI:   Jessica Duque is a 30year old female who presents with symptoms of UTI. Complaining of urinary frequency, urgency, dysuria for last 5 days. Symptoms have been worsening since onset.   Treatment /80   Pulse 103   Temp 98.7 °F (37.1 °C) (Tympanic)   Resp 15   Ht 5' 9\" (1.753 m)   Wt 230 lb (104.3 kg)   SpO2 99%   Breastfeeding No   BMI 33.97 kg/m²   GENERAL: well developed, well nourished,in no apparent distress  CARDIO: RRR, no murmurs  ИРИНА -If have increased urinary urgency, urinary frequency, blood in urine, fevers, chills, sweats, back pain, or abdominal pain, please seek medical care immediately. What can you do to help prevent bladder infections? Urinate often during the day.  You sh · Cloudy urine  · Strong- or bad-smelling urine  · Unable to urinate (urinary retention)  · Unable to hold urine in (urinary incontinence)  · Fever  · Loss of appetite  · Confusion (in older adults)  Causes  Bladder infections are not contagious.  You can't These self-care steps can help prevent future infections:  · Drink plenty of fluids. This helps to prevent dehydration and flush out your bladder.  Do this unless you must restrict fluids for other health reasons, or your healthcare provider told you not to · Pain, redness, or swelling in the outer vaginal area (labia)  Andrés last reviewed this educational content on 11/1/2019  © 8722-9903 [employer]. All rights reserved.  This information is not intended as a substitute for professional medi

## 2021-02-16 NOTE — PATIENT INSTRUCTIONS
-Push fluids- gatorade, water, cranberry juice.  -Will call in 1-3 days with urine culture results  -If have increased urinary urgency, urinary frequency, blood in urine, fevers, chills, sweats, back pain, or abdominal pain, please seek medical care immedi in urine  · Belly (abdominal) discomfort. This is often in the lower belly above the pubic bone.   · Cloudy urine  · Strong- or bad-smelling urine  · Unable to urinate (urinary retention)  · Unable to hold urine in (urinary incontinence)  · Fever  · Loss of your urine a bright orange color. This can stain clothing. Care and prevention  These self-care steps can help prevent future infections:  · Drink plenty of fluids. This helps to prevent dehydration and flush out your bladder.  Do this unless you must rest dizziness  · Vaginal discharge  · Pain, redness, or swelling in the outer vaginal area (labia)  Andrés last reviewed this educational content on 11/1/2019  © 7498-5374 [employer] [address]. All rights reserved.  This information is not intended as a

## 2021-06-01 ENCOUNTER — MED REC SCAN ONLY (OUTPATIENT)
Dept: FAMILY MEDICINE CLINIC | Facility: CLINIC | Age: 36
End: 2021-06-01

## 2021-06-01 NOTE — PROGRESS NOTES
Ms. Powell is 83 y.o. female    CHIEF COMPLAINT: I had a UTI.     HPI  Recent Urinary Tract Infection  The patient presents after a recent episode classified anatomically as recurrent cystitis. The symptoms started 2weeks ago. Symptoms included mental status changes. These are better. Possible coexisting conditions or situations that may predispose the patient to urinary tract include prolonged menopausal state that is untreated. The patient  Evaluation included ds uti evaluation: renal ultrasound, ct scan, urinalysis, urine culture, physical exam and history. A urine culture grew Citrobacter.  The patient was treated with antibiotics which did provide some relief.     The following portions of the patient's history were reviewed and updated as appropriate: allergies, current medications, past family history, past medical history, past social history, past surgical history and problem list.      Review of Systems   Constitutional: Negative for chills and fever.   Gastrointestinal: Negative for abdominal pain, anal bleeding and blood in stool.   Genitourinary: Negative for flank pain and hematuria.           Current Outpatient Prescriptions:   •  acetaminophen (TYLENOL) 650 MG 8 hr tablet, Take 1,000 mg by mouth As Needed for Mild Pain ., Disp: , Rfl:   •  alendronate (FOSAMAX) 70 MG tablet, Take 70 mg by mouth Every 7 (Seven) Days., Disp: , Rfl:   •  amLODIPine (NORVASC) 2.5 MG tablet, Take 5 mg by mouth Daily., Disp: , Rfl:   •  B Complex Vitamins (VITAMIN B COMPLEX PO), Take 1 tablet by mouth Daily., Disp: , Rfl:   •  butalbital-acetaminophen-caffeine (FIORICET, ESGIC) -40 MG per tablet, Take 1 tablet by mouth As Needed for headaches., Disp: , Rfl:   •  Calcium Carb-Cholecalciferol (CALCIUM 600+D3) 600-800 MG-UNIT tablet, Take 1 tablet by mouth Daily., Disp: , Rfl:   •  camphor-menthol (SARNA) 0.5-0.5 % lotion, Apply  topically As Needed for itching., Disp: , Rfl:   •  cholecalciferol (VITAMIN D3) 1000  Colonoscopy normal. UNITS tablet, Take 1,000 Units by mouth Daily., Disp: , Rfl:   •  colestipol (COLESTID) 1 G tablet, Take 1 g by mouth As Needed., Disp: , Rfl:   •  dipyridamole (PERSANTINE) 75 MG tablet, Take 75 mg by mouth 2 (Two) Times a Day., Disp: , Rfl:   •  docusate sodium (COLACE) 100 MG capsule, Take 100 mg by mouth As Needed for constipation., Disp: , Rfl:   •  donepezil (ARICEPT) 10 MG tablet, Take 10 mg by mouth Every Night., Disp: , Rfl:   •  fexofenadine (ALLEGRA) 180 MG tablet, Take 180 mg by mouth As Needed., Disp: , Rfl:   •  hydrALAZINE (APRESOLINE) 50 MG tablet, Take 50 mg by mouth 3 (Three) Times a Day., Disp: , Rfl:   •  levETIRAcetam (KEPPRA) 750 MG tablet, Take 1,000 mg by mouth 2 (Two) Times a Day., Disp: , Rfl:   •  linezolid (ZYVOX) 600 MG tablet, Take 600 mg by mouth 2 (Two) Times a Day., Disp: , Rfl:   •  Lutein 20 MG tablet, Take 1 tablet by mouth Daily., Disp: , Rfl:   •  meloxicam (MOBIC) 15 MG tablet, Take 15 mg by mouth Daily., Disp: , Rfl:   •  nebivolol (BYSTOLIC) 10 MG tablet, Take 10 mg by mouth 2 (Two) Times a Day., Disp: , Rfl:   •  Omega 3-6-9 Fatty Acids (OMEGA 3-6-9 COMPLEX PO), Take 1 capsule by mouth Daily., Disp: , Rfl:   •  oxybutynin (DITROPAN) 5 MG tablet, TAKE 2 TABLETS BY MOUTH TWICE DAILY., Disp: 120 tablet, Rfl: 0  •  pantoprazole (PROTONIX) 20 MG EC tablet, Take 20 mg by mouth Daily., Disp: , Rfl:   •  pravastatin (PRAVACHOL) 20 MG tablet, Take 20 mg by mouth Daily., Disp: , Rfl:   •  promethazine (PHENERGAN) 25 MG tablet, Take 25 mg by mouth Every 4 (Four) Hours As Needed for nausea or vomiting., Disp: , Rfl:   •  ramipril (ALTACE) 5 MG capsule, Take 5 mg by mouth Daily., Disp: , Rfl:   •  simethicone (MYLICON) 80 MG chewable tablet, Chew 125 mg As Needed for Flatulence., Disp: , Rfl:   •  sodium chloride 1 g tablet, Take 1 g by mouth 3 (Three) Times a Day., Disp: , Rfl:   •  spironolactone (ALDACTONE) 25 MG tablet, Take 25 mg by mouth Daily., Disp: , Rfl:   •  vitamin C  "(ASCORBIC ACID) 500 MG tablet, Take 500 mg by mouth Daily., Disp: , Rfl:   •  nitrofurantoin (MACRODANTIN) 50 MG capsule, Take 1 capsule by mouth Every Night for 30 days., Disp: 30 capsule, Rfl: 2    Past Medical History:   Diagnosis Date   • Arthritis    • Frequency of urination    • Nocturia    • Seizure    • Sensory urge incontinence        Past Surgical History:   Procedure Laterality Date   • CHOLECYSTECTOMY     • TONSILLECTOMY     • WISDOM TOOTH EXTRACTION         Social History     Social History   • Marital status:      Social History Main Topics   • Smoking status: Never Smoker   • Smokeless tobacco: Never Used   • Alcohol use No       Family History   Problem Relation Age of Onset   • No Known Problems Mother    • No Known Problems Father          Temp 97.8 °F (36.6 °C)  Ht 154.9 cm (61\")  Wt 54.4 kg (120 lb)  BMI 22.67 kg/m2      Physical Exam  Alert and oriented ×3  Not agitated or distressed  No obvious deformities  No respiratory distress  Skin without pallor or diaphoresis      Data  Results for orders placed or performed in visit on 12/06/16   POC Urinalysis Dipstick, Automated   Result Value Ref Range    Color Yellow Yellow, Straw, Dark Yellow, Lynnette    Clarity, UA Clear Clear    Glucose, UA Negative Negative mg/dL    Bilirubin Negative Negative    Ketones, UA Negative Negative    Specific Gravity  1.020 1.005 - 1.030    Blood, UA Trace (A) Negative    pH, Urine 5.5 5.0 - 8.0    Protein, POC Negative Negative mg/dL    Urobilinogen, UA Normal Normal    Leukocytes Negative Negative    Nitrite, UA Positive (A) Negative         Imaging Results (last 7 days)     ** No results found for the last 168 hours. **            Assessment and Plan  Diagnoses and all orders for this visit:    Urinary tract infection without hematuria, site unspecified  -     nitrofurantoin (MACRODANTIN) 50 MG capsule; Take 1 capsule by mouth Every Night for 30 days.    The patient understands the recurrent urinary tract " infections are often multifactorial in origin.  Discussion of optimum management in setting of no anatomic abnormalities for recurrent UTIs is completed.  Antimicrobial therapies including the risks, convenience, and rationale were explained including postcoital prophylaxis, self start therapy and daily prophylaxis are all explained.  Based upon this discussion we have elected to start daily nitrofurantoin.  I did explain that some bacteria or naturally resistant to this antibiotic but it does kill most uropathogens including Escherichia coli typically.  I explained that the attractive thickness of this antibiotic in this setting is that the resistance pattern has not changed over decades unlike some other antibiotics such as ciprofloxacin or levofloxacin.  I did explain that if she develops symptoms that are consistent with UTI while taking this antibiotic, it is possible that she will need a different antibiotic and should contact our office to arrange to be seen and have a urine culture done.  This will need to be done by an in and out cath I explained that if we were unavailable at in this setting she should see either her primary care physician or visit the [facility].  The risk of pulmonary fibrosis is explained.  Chronic cough, dyspnea, hemoptysis, or other new pulmonary symptoms should be reported right away.  The patient expresses an understanding.  Will do this for 12  Months.    Ok to stop oxybutynin. If incontinence recurs, plan to start Myrbetriq.     Nemesio Foster MD  03/08/18  9:55 AM      Cc: Horacio Thornton MD

## 2021-08-04 ENCOUNTER — TELEPHONE (OUTPATIENT)
Dept: FAMILY MEDICINE CLINIC | Facility: CLINIC | Age: 36
End: 2021-08-04

## 2021-11-10 NOTE — PROGRESS NOTES
Patient is overdue for Diabetic eye exam. [employer] message sent with information regarding this. Negative

## 2022-02-27 ENCOUNTER — OFFICE VISIT (OUTPATIENT)
Dept: FAMILY MEDICINE CLINIC | Facility: CLINIC | Age: 37
End: 2022-02-27
Payer: COMMERCIAL

## 2022-02-27 VITALS
HEART RATE: 84 BPM | WEIGHT: 230 LBS | DIASTOLIC BLOOD PRESSURE: 82 MMHG | SYSTOLIC BLOOD PRESSURE: 110 MMHG | TEMPERATURE: 98 F | BODY MASS INDEX: 34.07 KG/M2 | HEIGHT: 69 IN | RESPIRATION RATE: 14 BRPM | OXYGEN SATURATION: 98 %

## 2022-02-27 DIAGNOSIS — J01.00 ACUTE NON-RECURRENT MAXILLARY SINUSITIS: Primary | ICD-10-CM

## 2022-02-27 DIAGNOSIS — R05.9 COUGH: ICD-10-CM

## 2022-02-27 PROCEDURE — 3079F DIAST BP 80-89 MM HG: CPT | Performed by: NURSE PRACTITIONER

## 2022-02-27 PROCEDURE — 99213 OFFICE O/P EST LOW 20 MIN: CPT | Performed by: NURSE PRACTITIONER

## 2022-02-27 PROCEDURE — 3074F SYST BP LT 130 MM HG: CPT | Performed by: NURSE PRACTITIONER

## 2022-02-27 PROCEDURE — 3008F BODY MASS INDEX DOCD: CPT | Performed by: NURSE PRACTITIONER

## 2022-02-27 RX ORDER — BENZONATATE 200 MG/1
200 CAPSULE ORAL 3 TIMES DAILY PRN
Qty: 20 CAPSULE | Refills: 0 | Status: SHIPPED | OUTPATIENT
Start: 2022-02-27 | End: 2022-03-06

## 2022-02-27 RX ORDER — ALBUTEROL SULFATE 90 UG/1
2 AEROSOL, METERED RESPIRATORY (INHALATION) EVERY 4 HOURS PRN
Qty: 1 EACH | Refills: 0 | Status: SHIPPED | OUTPATIENT
Start: 2022-02-27 | End: 2022-09-25

## 2022-02-27 RX ORDER — AMOXICILLIN AND CLAVULANATE POTASSIUM 875; 125 MG/1; MG/1
1 TABLET, FILM COATED ORAL 2 TIMES DAILY
Qty: 20 TABLET | Refills: 0 | Status: SHIPPED | OUTPATIENT
Start: 2022-02-27 | End: 2022-03-09

## 2022-03-09 ENCOUNTER — TELEPHONE (OUTPATIENT)
Dept: FAMILY MEDICINE CLINIC | Facility: CLINIC | Age: 37
End: 2022-03-09

## 2022-03-09 NOTE — TELEPHONE ENCOUNTER
Patient advised due for physical and labs. She will call back to schedule. States she had her eye exam done by Dr. Leahy [facility] in [facility]. Contacted Dr. Trish Lomas office. Will fax report.

## 2022-03-11 ENCOUNTER — TELEPHONE (OUTPATIENT)
Dept: FAMILY MEDICINE CLINIC | Facility: CLINIC | Age: 37
End: 2022-03-11

## 2022-03-11 ENCOUNTER — OFFICE VISIT (OUTPATIENT)
Dept: FAMILY MEDICINE CLINIC | Facility: CLINIC | Age: 37
End: 2022-03-11
Payer: COMMERCIAL

## 2022-03-11 ENCOUNTER — APPOINTMENT (OUTPATIENT)
Dept: GENERAL RADIOLOGY | Age: 37
End: 2022-03-11
Attending: NURSE PRACTITIONER
Payer: COMMERCIAL

## 2022-03-11 ENCOUNTER — HOSPITAL ENCOUNTER (OUTPATIENT)
Age: 37
Discharge: HOME | End: 2022-03-11
Payer: COMMERCIAL

## 2022-03-11 VITALS
SYSTOLIC BLOOD PRESSURE: 122 MMHG | TEMPERATURE: 98 F | OXYGEN SATURATION: 98 % | DIASTOLIC BLOOD PRESSURE: 78 MMHG | HEART RATE: 82 BPM

## 2022-03-11 VITALS
DIASTOLIC BLOOD PRESSURE: 94 MMHG | HEART RATE: 73 BPM | OXYGEN SATURATION: 99 % | SYSTOLIC BLOOD PRESSURE: 145 MMHG | RESPIRATION RATE: 16 BRPM | TEMPERATURE: 98 F

## 2022-03-11 DIAGNOSIS — R06.00 DYSPNEA, UNSPECIFIED TYPE: ICD-10-CM

## 2022-03-11 DIAGNOSIS — Z02.9 ENCOUNTERS FOR ADMINISTRATIVE PURPOSES: Primary | ICD-10-CM

## 2022-03-11 DIAGNOSIS — R09.81 NASAL CONGESTION: ICD-10-CM

## 2022-03-11 DIAGNOSIS — J06.9 VIRAL URI WITH COUGH: Primary | ICD-10-CM

## 2022-03-11 LAB
POCT INFLUENZA A: NEGATIVE
POCT INFLUENZA B: NEGATIVE

## 2022-03-11 PROCEDURE — 3080F DIAST BP >= 90 MM HG: CPT | Performed by: NURSE PRACTITIONER

## 2022-03-11 PROCEDURE — 3077F SYST BP >= 140 MM HG: CPT | Performed by: NURSE PRACTITIONER

## 2022-03-11 PROCEDURE — 87502 INFLUENZA DNA AMP PROBE: CPT | Performed by: NURSE PRACTITIONER

## 2022-03-11 PROCEDURE — 71046 X-RAY EXAM CHEST 2 VIEWS: CPT | Performed by: NURSE PRACTITIONER

## 2022-03-11 PROCEDURE — 99213 OFFICE O/P EST LOW 20 MIN: CPT | Performed by: NURSE PRACTITIONER

## 2022-03-11 RX ORDER — PREDNISONE 20 MG/1
40 TABLET ORAL DAILY
Qty: 10 TABLET | Refills: 0 | Status: SHIPPED | OUTPATIENT
Start: 2022-03-11 | End: 2022-03-16

## 2022-03-11 RX ORDER — DULAGLUTIDE 3 MG/.5ML
INJECTION, SOLUTION SUBCUTANEOUS
COMMUNITY
End: 2022-03-15 | Stop reason: ALTCHOICE

## 2022-03-11 NOTE — ED INITIAL ASSESSMENT (HPI)
Pt sts 1 month ago began with cough/cold symptoms. Went to [facility] care 2 weeks ago due to chest tightness/SOB-  antibiotic, cough meds, albuterol MDI. Sts was improving but then symptoms worsened this week- increased nasal congestion, SOB at times, . Denies fever or productive cough. Went to [facility], instructed to come here for cxr.

## 2022-04-26 NOTE — TELEPHONE ENCOUNTER
Patient states that she was seen in the [address] [place] [address] on 2/21. She was diagnosed with bronchitis. She was put on augmentin, prednisone, and nebulizer. Patient was told to follow up in 3 days. Advised that per Dr. Sruthi Pickering, patient should follow up on Tuesday.   Neli [Negative] : Heme/Lymph

## 2022-12-16 LAB
AMB EXT BUN: 18 MG/DL
AMB EXT CREATININE, URINE: 0.85 MG/DL
AMB EXT CREATININE: 0.94 MG/DL
AMB EXT EGFR NON-AA: 81
AMB EXT HGBA1C: 6.3 %
AMB EXT MALB URINE CALC: 1 MG/24HR

## 2022-12-23 ENCOUNTER — OFFICE VISIT (OUTPATIENT)
Dept: FAMILY MEDICINE CLINIC | Facility: CLINIC | Age: 37
End: 2022-12-23
Payer: COMMERCIAL

## 2022-12-23 VITALS
SYSTOLIC BLOOD PRESSURE: 128 MMHG | TEMPERATURE: 98 F | BODY MASS INDEX: 34.07 KG/M2 | HEART RATE: 80 BPM | OXYGEN SATURATION: 98 % | DIASTOLIC BLOOD PRESSURE: 74 MMHG | WEIGHT: 230 LBS | HEIGHT: 69 IN | RESPIRATION RATE: 18 BRPM

## 2022-12-23 DIAGNOSIS — Z20.818 EXPOSURE TO STREP THROAT: ICD-10-CM

## 2022-12-23 DIAGNOSIS — J02.9 SORE THROAT: Primary | ICD-10-CM

## 2022-12-23 DIAGNOSIS — H65.02 NON-RECURRENT ACUTE SEROUS OTITIS MEDIA OF LEFT EAR: ICD-10-CM

## 2022-12-23 LAB
CONTROL LINE PRESENT WITH A CLEAR BACKGROUND (YES/NO): YES YES/NO
STREP GRP A CUL-SCR: NEGATIVE

## 2022-12-23 RX ORDER — AMOXICILLIN 875 MG/1
875 TABLET, COATED ORAL 2 TIMES DAILY
Qty: 20 TABLET | Refills: 0 | Status: SHIPPED | OUTPATIENT
Start: 2022-12-23 | End: 2023-01-02

## 2023-06-28 ENCOUNTER — TELEPHONE (OUTPATIENT)
Dept: FAMILY MEDICINE CLINIC | Facility: CLINIC | Age: 38
End: 2023-06-28

## 2023-08-21 ENCOUNTER — TELEPHONE (OUTPATIENT)
Dept: FAMILY MEDICINE CLINIC | Facility: CLINIC | Age: 38
End: 2023-08-21

## 2023-12-04 LAB — AMB EXT HGBA1C: 6.4 %

## 2023-12-24 ENCOUNTER — OFFICE VISIT (OUTPATIENT)
Dept: FAMILY MEDICINE CLINIC | Facility: CLINIC | Age: 38
End: 2023-12-24
Payer: COMMERCIAL

## 2023-12-24 VITALS
RESPIRATION RATE: 16 BRPM | WEIGHT: 235 LBS | DIASTOLIC BLOOD PRESSURE: 68 MMHG | HEIGHT: 69 IN | SYSTOLIC BLOOD PRESSURE: 112 MMHG | TEMPERATURE: 97 F | OXYGEN SATURATION: 98 % | HEART RATE: 96 BPM | BODY MASS INDEX: 34.8 KG/M2

## 2023-12-24 DIAGNOSIS — J02.9 SORE THROAT: ICD-10-CM

## 2023-12-24 DIAGNOSIS — J02.0 STREP PHARYNGITIS: Primary | ICD-10-CM

## 2023-12-24 LAB
CONTROL LINE PRESENT WITH A CLEAR BACKGROUND (YES/NO): YES YES/NO
KIT LOT #: ABNORMAL NUMERIC
STREP GRP A CUL-SCR: POSITIVE

## 2023-12-24 RX ORDER — SEMAGLUTIDE 2.68 MG/ML
2 INJECTION, SOLUTION SUBCUTANEOUS
COMMUNITY
Start: 2023-11-12

## 2023-12-24 RX ORDER — AMOXICILLIN 875 MG/1
875 TABLET, COATED ORAL 2 TIMES DAILY
Qty: 20 TABLET | Refills: 0 | Status: SHIPPED | OUTPATIENT
Start: 2023-12-24

## 2024-02-19 ENCOUNTER — OFFICE VISIT (OUTPATIENT)
Dept: FAMILY MEDICINE CLINIC | Facility: CLINIC | Age: 39
End: 2024-02-19
Payer: COMMERCIAL

## 2024-02-19 VITALS
DIASTOLIC BLOOD PRESSURE: 78 MMHG | SYSTOLIC BLOOD PRESSURE: 126 MMHG | HEIGHT: 67.99 IN | WEIGHT: 240 LBS | BODY MASS INDEX: 36.37 KG/M2 | OXYGEN SATURATION: 99 % | TEMPERATURE: 99 F | RESPIRATION RATE: 18 BRPM | HEART RATE: 90 BPM

## 2024-02-19 DIAGNOSIS — Z01.419 ENCOUNTER FOR WELL WOMAN EXAM: Primary | ICD-10-CM

## 2024-02-19 DIAGNOSIS — Z12.4 CERVICAL CANCER SCREENING: ICD-10-CM

## 2024-02-19 DIAGNOSIS — Z00.00 WELLNESS EXAMINATION: ICD-10-CM

## 2024-02-19 DIAGNOSIS — E11.9 TYPE 2 DIABETES MELLITUS WITHOUT COMPLICATION, UNSPECIFIED WHETHER LONG TERM INSULIN USE (HCC): ICD-10-CM

## 2024-02-19 PROCEDURE — 3078F DIAST BP <80 MM HG: CPT

## 2024-02-19 PROCEDURE — 3008F BODY MASS INDEX DOCD: CPT

## 2024-02-19 PROCEDURE — 88175 CYTOPATH C/V AUTO FLUID REDO: CPT

## 2024-02-19 PROCEDURE — 87624 HPV HI-RISK TYP POOLED RSLT: CPT

## 2024-02-19 PROCEDURE — 3074F SYST BP LT 130 MM HG: CPT

## 2024-02-19 PROCEDURE — 99395 PREV VISIT EST AGE 18-39: CPT

## 2024-02-19 NOTE — PROGRESS NOTES
HPI:   Jewell Colmenarse is a 38 year old female who presents for an Annual Health Visit.     LMP:   Last pap 21 normal    Sees endocrinology-Ozempic  Having genetic testing through [facility] for diabetes typing  Sees Derm, recent FSE  Allergies:   No Known Allergies    CURRENT MEDICATIONS   Current Outpatient Medications   Medication Sig Dispense Refill    TRULANCE 3 MG Oral Tab Take 1 tablet by mouth daily.      OZEMPIC, 2 MG/DOSE, 8 MG/3ML Subcutaneous Solution Pen-injector Inject 2 mg into the skin every 7 days. (Patient not taking: Reported on 2024)      amoxicillin 875 MG Oral Tab Take 1 tablet (875 mg total) by mouth 2 (two) times daily. (Patient not taking: Reported on 2024) 20 tablet 0    Glucose Blood (ACCU-CHEK SMARTVIEW) In Vitro Strip 1 strip by In Vitro route 2 (two) times daily.      Glucose Blood (ACCU-CHEK SMARTVIEW) In Vitro Strip 1 strip by Other route 2 (two) times daily.      atorvastatin 20 MG Oral Tab TK 1 T PO D        HISTORICAL INFORMATION   Past Medical History:   Diagnosis Date    Diabetes (HCC)     Left ACL tear     Type 2 diabetes mellitus (HCC)       Past Surgical History:   Procedure Laterality Date    OTHER      ; D & C procedure    OTHER      L knee ACL/meniscus repair 2018      Family History   Problem Relation Age of Onset    Hypertension Mother     Heart Disease Other         Grandfather    Hypertension Other         Grandfather    Diabetes Other       SOCIAL HISTORY   Social History     Socioeconomic History    Marital status:    Occupational History    Occupation: Teacher   Tobacco Use    Smoking status: Never    Smokeless tobacco: Never   Vaping Use    Vaping Use: Never used   Substance and Sexual Activity    Alcohol use: No     Comment: occ    Drug use: No   Other Topics Concern    Caffeine Concern No    Exercise No    Seat Belt Yes     Comment: 100%     Social History     Social History Narrative    Not on file        REVIEW OF  SYSTEMS:     Review of Systems   Constitutional:  Negative for chills, fatigue, fever and unexpected weight change.   HENT: Negative.     Eyes:  Negative for photophobia, redness and visual disturbance.   Respiratory: Negative.     Cardiovascular:  Negative for chest pain and palpitations.   Gastrointestinal:  Positive for constipation and nausea (thinks is s/e from ozempic she will discuss with endocrine). Negative for diarrhea and vomiting.   Endocrine: Negative for polydipsia, polyphagia and polyuria.   Genitourinary:  Negative for dysuria, frequency, menstrual problem and urgency.   Skin:  Negative for rash.   Neurological:  Negative for dizziness, light-headedness and headaches.         EXAM:   /78 (BP Location: Left arm, Patient Position: Sitting, Cuff Size: adult)   Pulse 90   Temp 98.6 °F (37 °C) (Temporal)   Resp 18   Ht 5' 7.99\" (1.727 m)   Wt 240 lb (108.9 kg)   SpO2 99%   BMI 36.50 kg/m²    Wt Readings from Last 6 Encounters:   02/19/24 240 lb (108.9 kg)   12/24/23 235 lb (106.6 kg)   12/23/22 230 lb (104.3 kg)   03/15/22 237 lb 3.2 oz (107.6 kg)   02/27/22 230 lb (104.3 kg)   02/15/21 230 lb (104.3 kg)     Body mass index is 36.5 kg/m².    Physical Exam  Vitals and nursing note reviewed.   Constitutional:       General: She is not in acute distress.     Appearance: Normal appearance.   HENT:      Head: Atraumatic.      Right Ear: External ear normal.      Left Ear: External ear normal.      Mouth/Throat:      Mouth: Mucous membranes are moist.   Eyes:      General:         Right eye: No discharge.         Left eye: No discharge.      Conjunctiva/sclera: Conjunctivae normal.   Cardiovascular:      Rate and Rhythm: Normal rate and regular rhythm.      Pulses: Normal pulses.      Heart sounds: Normal heart sounds.   Pulmonary:      Effort: Pulmonary effort is normal.      Breath sounds: Normal breath sounds.   Abdominal:      General: Abdomen is flat. Bowel sounds are normal.      Palpations:  Abdomen is soft.      Tenderness: There is no abdominal tenderness. There is no guarding or rebound.   Genitourinary:     General: Normal vulva.      Vagina: Normal.      Cervix: Normal.      Adnexa: Right adnexa normal and left adnexa normal.   Musculoskeletal:         General: Normal range of motion.      Cervical back: Neck supple.   Skin:     General: Skin is warm and dry.      Capillary Refill: Capillary refill takes 2 to 3 seconds.   Neurological:      General: No focal deficit present.      Mental Status: She is alert.          ASSESSMENT AND PLAN:   Jewell was seen today for physical.    Diagnoses and all orders for this visit:    Encounter for well woman exam  -     BREAST AND PELVIC EXAM []    Cervical cancer screening  -     ThinPrep PAP with HPV Reflex Request; Future  -     ThinPrep PAP with HPV Reflex Request  -     Image-Guided Pap Smear ([facility])  -     Hpv Dna  High Risk , Thin Prep Collect    Wellness examination    Type 2 diabetes mellitus without complication, unspecified whether long term insulin use (HCC)      Anticipatory care discussed sun and car safety.  Discussed diet, exercise, monthly self breast exams, yearly FSE.  There are no Patient Instructions on file for this visit.    The patient indicates understanding of these issues and agrees to the plan.    Problem List:  Patient Active Problem List   Diagnosis    Type 2 diabetes mellitus (HCC)    Left anterior cruciate ligament tear    Benign essential hypertension       ROCK Hines  2/19/2024  1:03 PM

## 2024-02-22 LAB
.: NORMAL
.: NORMAL

## 2024-02-23 LAB — HPV I/H RISK 1 DNA SPEC QL NAA+PROBE: NEGATIVE

## 2024-06-11 LAB
AMB EXT BUN: 12 MG/DL
AMB EXT CREATININE, URINE: 185.4 MG/DL
AMB EXT CREATININE: 0.9 MG/DL
AMB EXT EGFR NON-AA: 82
AMB EXT MALB URINE CALC: 1 MG/24HR
AMB EXT MALB/CRE CALC: 5 UG/MG

## 2024-07-17 ENCOUNTER — PATIENT MESSAGE (OUTPATIENT)
Dept: FAMILY MEDICINE CLINIC | Facility: CLINIC | Age: 39
End: 2024-07-17

## 2024-07-17 NOTE — TELEPHONE ENCOUNTER
From: Jewell Colmenares  To: DONNA Torres  Sent: 7/17/2024 8:44 AM CDT  Subject: Rash    Good Morning Dr. Torres,    I have been breaking out in a rash after I go in the sun. It started on Sunday. Yesterday, it was really bad. I have attached a few pictures from early this morning (1:30am). I was going to try to see you this morning, but now there is nothing there to see. Could this be heat rash? Also, what can I do for it for this not to happen again? I took some Benadryl to help with the itching and to sleep. Can you please let me know your opinion on this?    Thank you so much! Not being able to be in the sun and heat puts a damper on my summer!! lol    Thanks again,  Jewell Colmenares

## 2024-08-01 ENCOUNTER — TELEPHONE (OUTPATIENT)
Dept: FAMILY MEDICINE CLINIC | Facility: CLINIC | Age: 39
End: 2024-08-01

## 2024-08-01 NOTE — TELEPHONE ENCOUNTER
Entered microalbumin, GFR results from [facility] from 6/11/24 and A1c from 12/4/23 into External Results.

## 2024-12-31 NOTE — PATIENT INSTRUCTIONS
1.  Encourage fluids, humidifier/vaporizor at bedside, elevate head of bed (sleep with extra pillow), vapor rub to chest, steam therapy if no fever, warm compresses for sinus pressure if no fever, salt water gargles for sore throat, lozenges for sore throa · Over-the-counter decongestants may be used unless a similar medicine was prescribed. Nasal sprays work the fastest. Use one that contains phenylephrine or oxymetazoline. First blow the nose gently. Then use the spray.  Do not use these medicines more ofte © 6393-2170 [employer] [address]. [address] [facility] [place]. All rights reserved. This information is not intended as a substitute for professional medical care. Always follow your healthcare professional's instructions. (4) no limitation

## 2025-04-19 ENCOUNTER — OFFICE VISIT (OUTPATIENT)
Dept: FAMILY MEDICINE CLINIC | Facility: CLINIC | Age: 40
End: 2025-04-19
Payer: COMMERCIAL

## 2025-04-19 VITALS
HEIGHT: 69 IN | TEMPERATURE: 98 F | SYSTOLIC BLOOD PRESSURE: 114 MMHG | HEART RATE: 72 BPM | BODY MASS INDEX: 34.51 KG/M2 | DIASTOLIC BLOOD PRESSURE: 86 MMHG | RESPIRATION RATE: 12 BRPM | OXYGEN SATURATION: 99 % | WEIGHT: 233 LBS

## 2025-04-19 DIAGNOSIS — Z15.89: ICD-10-CM

## 2025-04-19 DIAGNOSIS — M10.49 OTHER SECONDARY ACUTE GOUT OF MULTIPLE SITES: Primary | ICD-10-CM

## 2025-04-19 PROBLEM — G56.03 BILATERAL CARPAL TUNNEL SYNDROME: Status: ACTIVE | Noted: 2023-02-08

## 2025-04-19 LAB
ANION GAP SERPL CALC-SCNC: 10 MMOL/L (ref 0–18)
BUN BLD-MCNC: 13 MG/DL (ref 9–23)
CALCIUM BLD-MCNC: 9.5 MG/DL (ref 8.7–10.6)
CHLORIDE SERPL-SCNC: 105 MMOL/L (ref 98–112)
CO2 SERPL-SCNC: 25 MMOL/L (ref 21–32)
CREAT BLD-MCNC: 1 MG/DL (ref 0.55–1.02)
EGFRCR SERPLBLD CKD-EPI 2021: 74 ML/MIN/1.73M2 (ref 60–?)
GLUCOSE BLD-MCNC: 150 MG/DL (ref 70–99)
OSMOLALITY SERPL CALC.SUM OF ELEC: 293 MOSM/KG (ref 275–295)
POTASSIUM SERPL-SCNC: 4 MMOL/L (ref 3.5–5.1)
SODIUM SERPL-SCNC: 140 MMOL/L (ref 136–145)
URATE SERPL-MCNC: 4.4 MG/DL (ref 3.1–7.8)

## 2025-04-19 PROCEDURE — 84550 ASSAY OF BLOOD/URIC ACID: CPT | Performed by: FAMILY MEDICINE

## 2025-04-19 PROCEDURE — 80048 BASIC METABOLIC PNL TOTAL CA: CPT | Performed by: FAMILY MEDICINE

## 2025-04-19 RX ORDER — ORAL SEMAGLUTIDE 14 MG/1
1 TABLET ORAL DAILY
COMMUNITY

## 2025-04-19 RX ORDER — PREDNISONE 20 MG/1
20 TABLET ORAL 2 TIMES DAILY
Qty: 10 TABLET | Refills: 0 | Status: SHIPPED | OUTPATIENT
Start: 2025-04-19 | End: 2025-04-24

## 2025-04-19 NOTE — PROGRESS NOTES
Jewell Colmenares is a 39 year old female.  Jewell is here to discuss knee and foot. pain. Had  left ACL reconstruction 2018. 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  No trauma or injury. Was in [place] and drank more ETOH than usual.     Past few days with excuisitely tender distal dorsal metarsals. Can't tolerated a bed sheet on her foot. Motrin helps some.  Thinks may be gout  Current Medications[1]   Past Medical History[2]   Social History:  Short Social Hx on File[3]     REVIEW OF SYSTEMS:   GENERAL HEALTH: feels well otherwise  SKIN: denies any unusual skin lesions   RESPIRATORY: denies cough  CARDIOVASCULAR: denies tachy  GI: denies abdominal pain  NEURO: denies headaches    EXAM:   /86 (BP Location: Left arm, Patient Position: Sitting, Cuff Size: large)   Pulse 72   Temp 97.6 °F (36.4 °C) (Temporal)   Resp 12   Ht 5' 9\" (1.753 m)   Wt 233 lb (105.7 kg)   SpO2 99%   BMI 34.41 kg/m²   GENERAL: well developed, well nourished,in no apparent distress  NECK: supple,no adenopathy,no bruits  LUNGS: clear to auscultation  CARDIO: RRR without murmur  EXTREMITIES: no cyanosis, clubbing or edema, tender left distal metatarsal with slight edema. Tender lateral left knee    ASSESSMENT AND PLAN:   1. Monoallelic mutation of GCK gene  - sees endo - Dr. Lawton    2. Other secondary acute gout of multiple sites  - if UA elevated will give prednisone for potential future outbreak  - Uric Acid; Future  - Basic Metabolic Panel (8) [E]; Future  - predniSONE 20 MG Oral Tab; Take 1 tablet (20 mg total) by mouth 2 (two) times daily for 5 days.  Dispense: 10 tablet; Refill: 0  - Uric Acid  - Basic Metabolic Panel (8) [E]  \   The patient indicates understanding of these issues and agrees to the plan.  The patient is asked to return in 1 month.         [1]   Current Outpatient Medications   Medication Sig Dispense Refill    predniSONE 20 MG Oral Tab Take 1 tablet (20 mg total) by mouth 2 (two) times daily for 5 days. 10  tablet 0    Glucose Blood (ACCU-CHEK SMARTVIEW) In Vitro Strip 1 strip by In Vitro route 2 (two) times daily.      Glucose Blood (ACCU-CHEK SMARTVIEW) In Vitro Strip 1 strip by Other route 2 (two) times daily.      TRULANCE 3 MG Oral Tab Take 1 tablet by mouth daily.      atorvastatin 20 MG Oral Tab TK 1 T PO D      RYBELSUS 14 MG Oral Tab Take 1 tablet by mouth daily.     [2]   Past Medical History:   Diabetes (HCC)    Left ACL tear    Type 2 diabetes mellitus (HCC)   [3]   Social History  Socioeconomic History    Marital status:    Occupational History    Occupation: Teacher   Tobacco Use    Smoking status: Never    Smokeless tobacco: Never   Vaping Use    Vaping status: Never Used   Substance and Sexual Activity    Alcohol use: No     Comment: occ    Drug use: No   Other Topics Concern    Caffeine Concern No    Exercise No    Seat Belt Yes     Comment: 100%     Social Drivers of Health      Received from [facility]    Housing Stability

## 2025-05-08 ENCOUNTER — OFFICE VISIT (OUTPATIENT)
Dept: SURGERY | Facility: CLINIC | Age: 40
End: 2025-05-08
Payer: COMMERCIAL

## 2025-05-08 VITALS
OXYGEN SATURATION: 98 % | RESPIRATION RATE: 20 BRPM | TEMPERATURE: 98 F | SYSTOLIC BLOOD PRESSURE: 118 MMHG | DIASTOLIC BLOOD PRESSURE: 70 MMHG | HEART RATE: 100 BPM

## 2025-05-08 DIAGNOSIS — K59.01 CONSTIPATION, SLOW TRANSIT: Primary | ICD-10-CM

## 2025-05-08 PROCEDURE — 3074F SYST BP LT 130 MM HG: CPT | Performed by: SURGERY

## 2025-05-08 PROCEDURE — 3078F DIAST BP <80 MM HG: CPT | Performed by: SURGERY

## 2025-05-08 PROCEDURE — 99204 OFFICE O/P NEW MOD 45 MIN: CPT | Performed by: SURGERY

## 2025-05-08 NOTE — H&P
New Patient Visit Note       Active Problems      1. Constipation, slow transit        Chief Complaint   Chief Complaint   Patient presents with    New Patient     NP- Abdominal Pain- denies recent imaging, states constipation, nausea, reports abdominal issues since 2021, second opinion, last cscope in 2021 [facility]        History of Present Illness     The patient presents to request of her primary care physician for evaluation of chronic constipation.  Patient has longstanding history of constipation and states that she at best will have 1 bowel movement per week.  The patient feels excessively distended and experiences significant abdominal discomfort between bowel movements.  The patient has been placed on medication by her primary care physician and gastroenterologist with self-reported marginal benefit.  Patient has had colonoscopy which was normal, however, the patient required extended augmentation to the bowel prep because she had suboptimal results from the prep that was prescribed.  The patient is on Trulance and Rybelsus.  The patient states she does not routinely achieve 20 g of fiber intake daily.    The patient denies fever, chills, chest pain, shortness of breath, dyspnea. The patient also denies hematemesis, melena, or hematochezia. The patient denies change in bowel or bladder habits. There is no complaint of hematuria or dysuria.    I do lengthy discussion with the patient regarding dietary, activity, exercise and lifestyle recommendations.  I discussed the value of increased dietary fiber intake and use of MiraLAX.  I also encouraged the patient to continue following the recommendations of her gastroenterologist for further ongoing management and care.  I expressed to the patient that there is no surgical intervention required based on her current symptoms.    Allergies  Jewell has no known allergies.    Past Medical / Surgical / Social / Family History    The past medical and past  surgical history have been reviewed by me today.    Past Medical History[1]  Past Surgical History[2]    The family history and social history have been reviewed by me today.    Family History[3]  Social Hx on file[4]   Medications - Current[5]      Review of Systems  The Review of Systems has been reviewed by me during today.  Review of Systems   Constitutional:  Negative for chills, diaphoresis, fatigue, fever and unexpected weight change.   HENT:  Negative for hearing loss, nosebleeds, sore throat and trouble swallowing.    Respiratory:  Negative for apnea, cough, shortness of breath and wheezing.    Cardiovascular:  Negative for chest pain, palpitations and leg swelling.   Gastrointestinal:  Negative for abdominal distention, abdominal pain, anal bleeding, blood in stool, constipation, diarrhea, nausea and vomiting.   Genitourinary:  Negative for difficulty urinating, dysuria, frequency and urgency.   Musculoskeletal:  Negative for arthralgias and myalgias.   Skin:  Negative for color change and rash.   Neurological:  Negative for tremors, syncope and weakness.   Hematological:  Negative for adenopathy. Does not bruise/bleed easily.   Psychiatric/Behavioral:  Negative for behavioral problems and sleep disturbance.        Physical Findings   /70   Pulse 100   Temp 98.4 °F (36.9 °C) (Temporal)   Resp 20   SpO2 98%   Physical Exam  Vitals and nursing note reviewed.   Constitutional:       General: She is not in acute distress.     Appearance: Normal appearance. She is well-developed.   HENT:      Head: Normocephalic and atraumatic.   Eyes:      General: No scleral icterus.     Conjunctiva/sclera: Conjunctivae normal.   Neck:      Trachea: No tracheal deviation.   Cardiovascular:      Rate and Rhythm: Normal rate and regular rhythm.      Heart sounds: S1 normal and S2 normal. No murmur heard.  Pulmonary:      Effort: No accessory muscle usage or respiratory distress.      Breath sounds: No decreased breath  sounds, wheezing, rhonchi or rales.   Abdominal:      General: There is no distension or abdominal bruit.      Palpations: Abdomen is soft. Abdomen is not rigid. There is no shifting dullness, fluid wave, hepatomegaly, splenomegaly, mass or pulsatile mass.      Tenderness: There is no abdominal tenderness. There is no guarding or rebound. Negative signs include Garcia's sign and McBurney's sign.      Hernia: There is no hernia in the umbilical area or ventral area.   Skin:     General: Skin is warm and dry.   Neurological:      Mental Status: She is alert and oriented to person, place, and time.   Psychiatric:         Speech: Speech normal.         Behavior: Behavior normal.         Thought Content: Thought content normal.         Judgment: Judgment normal.             Assessment   1. Constipation, slow transit          Plan     The patient presents for opinion regarding chronic slow transit constipation.    The patient has had extensive GI workup including colonoscopy which was normal.    The patient is on Rybelsus and Trulance.    The patient does not routinely achieve 20 g of fiber daily intake.    I had a lengthy discussion with the patient regarding dietary, activity, exercise and lifestyle recommendations.  I discussed the importance of dietary fiber and intake and judicious use of MiraLAX to help augment bowel function.    Furthermore, I recommend the patient return to her gastroenterologist or seek another independent opinion for what appears to be functional slow transit and perhaps IBS-C.    The patient is to return to my attention on as-needed basis.    Patient voiced understanding of the care plan as discussed.    The patient was provided ample opportunity to ask questions.  All of the patient's questions were answered in detail.  The patient voiced understanding of the care plan.     No orders of the defined types were placed in this encounter.      Imaging & Referrals   None    Follow Up  No follow-ups  on file.    Wilmer Gardner MD         [1]   Past Medical History:   Diabetes (HCC)    Left ACL tear    Type 2 diabetes mellitus (HCC)   [2]   Past Surgical History:  Procedure Laterality Date    Colonoscopy      Other      ; D & C procedure    Other      L knee ACL/meniscus repair    [3]   Family History  Problem Relation Age of Onset    Hypertension Mother     Heart Disease Other         Grandfather    Hypertension Other         Grandfather    Diabetes Other    [4]   Social History  Socioeconomic History    Marital status:    Occupational History    Occupation: Teacher   Tobacco Use    Smoking status: Never    Smokeless tobacco: Never   Vaping Use    Vaping status: Never Used   Substance and Sexual Activity    Alcohol use: No     Comment: occ    Drug use: No   Other Topics Concern    Caffeine Concern No    Exercise No    Seat Belt Yes     Comment: 100%   [5]   Current Outpatient Medications:     RYBELSUS 14 MG Oral Tab, Take 1 tablet by mouth daily., Disp: , Rfl:     Glucose Blood (ACCU-CHEK SMARTVIEW) In Vitro Strip, 1 strip by In Vitro route 2 (two) times daily., Disp: , Rfl:     Glucose Blood (ACCU-CHEK SMARTVIEW) In Vitro Strip, 1 strip by Other route 2 (two) times daily., Disp: , Rfl:     TRULANCE 3 MG Oral Tab, Take 1 tablet by mouth daily., Disp: , Rfl:     atorvastatin 20 MG Oral Tab, TK 1 T PO D, Disp: , Rfl:

## 2025-06-19 ENCOUNTER — OFFICE VISIT (OUTPATIENT)
Dept: FAMILY MEDICINE CLINIC | Facility: CLINIC | Age: 40
End: 2025-06-19
Payer: COMMERCIAL

## 2025-06-19 VITALS
RESPIRATION RATE: 18 BRPM | BODY MASS INDEX: 34.25 KG/M2 | WEIGHT: 226 LBS | DIASTOLIC BLOOD PRESSURE: 78 MMHG | SYSTOLIC BLOOD PRESSURE: 130 MMHG | HEART RATE: 88 BPM | TEMPERATURE: 98 F | OXYGEN SATURATION: 98 % | HEIGHT: 68 IN

## 2025-06-19 DIAGNOSIS — Z00.00 WELLNESS EXAMINATION: Primary | ICD-10-CM

## 2025-06-19 DIAGNOSIS — E11.9 TYPE 2 DIABETES MELLITUS WITHOUT COMPLICATION, UNSPECIFIED WHETHER LONG TERM INSULIN USE (HCC): ICD-10-CM

## 2025-06-19 NOTE — PROGRESS NOTES
HPI:     Jewell Colmenares is a 39 year old female who presents for an Annual Health Visit.      Last pap 2/2024 normal repeat in 3 years    Following endocrinology Dr. Lawton  through [facility] labs 6/2025    Eye exam-last was 6/12/25  Foot exam    Allergies:   Allergies[1]    CURRENT MEDICATIONS:   Current Medications[2]   MEDICAL INFORMATION:   Past Medical History[3]   Past Surgical History[4]   Family History[5]   SOCIAL HISTORY:   Short Social Hx on File[6]  Social History     Social History Narrative    Not on file        REVIEW OF SYSTEMS:     Constitutional: negative  Eyes: negative for irritation, redness, and visual disturbance  ENT: negative for nasal congestion, sore throat, and tinnitus  Respiratory: negative for cough and dyspnea on exertion  Cardiovascular: negative for chest pain, chest pressure/discomfort, dyspnea, exertional chest pressure/discomfort, lower extremity edema, and palpitations  Gastrointestinal: positive for constipation  Integument/Breast: negative  Genitourinary: negative  Heme/Lymph: negative  Musculoskeletal: negative for back pain and neck pain  Neurological: negative  Psych: negative  Endocrine: negative  Allergic/Immune: negative        EXAM:   /78 (BP Location: Right arm, Patient Position: Sitting)   Pulse 88   Temp 98.2 °F (36.8 °C) (Temporal)   Resp 18   Ht 5' 8\" (1.727 m)   Wt 226 lb (102.5 kg)   SpO2 98%   BMI 34.36 kg/m²    Wt Readings from Last 6 Encounters:   06/19/25 226 lb (102.5 kg)   04/19/25 233 lb (105.7 kg)   02/19/24 240 lb (108.9 kg)   12/24/23 235 lb (106.6 kg)   12/23/22 230 lb (104.3 kg)   03/15/22 237 lb 3.2 oz (107.6 kg)     Body mass index is 34.36 kg/m².    General: alert, appears stated age, and cooperative  Head: Normocephalic, without obvious abnormality, atraumatic  Eyes: conjunctivae/corneas clear. PERRL, EOM's intact. Fundi benign.  Ears: normal TM's and external ear canals both ears  Nose: Nares normal. Septum midline.  Mucosa normal. No drainage or sinus tenderness.  Throat: lips, mucosa, and tongue normal; teeth and gums normal  Neck: no adenopathy, no carotid bruit, no JVD, supple, symmetrical, trachea midline, and thyroid not enlarged, symmetric, no tenderness/mass/nodules  Heart: S1, S2 normal, no murmur, click, rub or gallop, regular rate and rhythm  Lungs: clear to auscultation bilaterally  Breast: normal appearance, no masses or tenderness  Abdomen: soft, non-tender; bowel sounds normal; no masses,  no organomegaly  Pelvic: deferred  Back: symmetric, no curvature. ROM normal. No CVA tenderness.  Extremities: extremities normal, atraumatic, no cyanosis or edema  Pulses: 2+ and symmetric  Skin: Skin color, texture, turgor normal. No rashes or lesions  Lymph Nodes: Cervical, supraclavicular, and axillary nodes normal.  Neurologic: Sensory: normal      ASSESSMENT AND PLAN:   Jewell was seen today for well adult.    Diagnoses and all orders for this visit:    Wellness examination  -     Cancel: Comp Metabolic Panel (14); Future  -     Cancel: CBC With Differential With Platelet; Future  -     Cancel: Hemoglobin A1C; Future  -     Cancel: Lipid Panel; Future  -     Cancel: TSH W Reflex To Free T4; Future  -     Cancel: Microalb/Creat Ratio, Random Urine; Future    Type 2 diabetes mellitus without complication, unspecified whether long term insulin use (HCC)      Anticipatory care discussed including sun, car safety, diet, and exercise.  Patient defers labs as reports recently had them done through endocrinologist.  Recent dilated eye exam at [facility] earlier this month. Discussed due for pap smear 2/2027.   There are no Patient Instructions on file for this visit.    The patient indicates understanding of these issues and agrees to the plan.    Problem List:  Problem List[7]    Liane Ernandez, APRN  6/19/2025  10:11 AM             [1] No Known Allergies  [2]   Current Outpatient Medications   Medication Sig Dispense Refill     RYBELSUS 14 MG Oral Tab Take 1 tablet by mouth daily.      TRULANCE 3 MG Oral Tab Take 1 tablet by mouth daily.      atorvastatin 20 MG Oral Tab TK 1 T PO D      Glucose Blood (ACCU-CHEK SMARTVIEW) In Vitro Strip 1 strip by In Vitro route 2 (two) times daily.      Glucose Blood (ACCU-CHEK SMARTVIEW) In Vitro Strip 1 strip by Other route 2 (two) times daily.     [3]   Past Medical History:   Diabetes (HCC)    Left ACL tear    Type 2 diabetes mellitus (HCC)   [4]   Past Surgical History:  Procedure Laterality Date    Colonoscopy      Other      ; D & C procedure    Other      L knee ACL/meniscus repair    [5]   Family History  Problem Relation Age of Onset    Hypertension Mother     Heart Disease Other         Grandfather    Hypertension Other         Grandfather    Diabetes Other    [6]   Social History  Socioeconomic History    Marital status:    Occupational History    Occupation: Teacher   Tobacco Use    Smoking status: Never    Smokeless tobacco: Never   Vaping Use    Vaping status: Never Used   Substance and Sexual Activity    Alcohol use: No     Comment: occ    Drug use: No   Other Topics Concern    Caffeine Concern No    Exercise No    Seat Belt Yes     Comment: 100%     Social Drivers of Health      [facility] from [facility]    Housing Stability   [7]   Patient Active Problem List  Diagnosis    Type 2 diabetes mellitus (HCC)    Left anterior cruciate ligament tear    Benign essential hypertension    Bilateral carpal tunnel syndrome    Constipation, slow transit

## 2025-06-27 ENCOUNTER — OFFICE VISIT (OUTPATIENT)
Dept: FAMILY MEDICINE CLINIC | Facility: CLINIC | Age: 40
End: 2025-06-27
Payer: COMMERCIAL

## 2025-06-27 VITALS
SYSTOLIC BLOOD PRESSURE: 122 MMHG | BODY MASS INDEX: 35 KG/M2 | RESPIRATION RATE: 16 BRPM | TEMPERATURE: 98 F | HEART RATE: 85 BPM | WEIGHT: 227 LBS | DIASTOLIC BLOOD PRESSURE: 80 MMHG | OXYGEN SATURATION: 99 %

## 2025-06-27 DIAGNOSIS — L72.9 INFECTED CYST OF SKIN: Primary | ICD-10-CM

## 2025-06-27 DIAGNOSIS — L08.9 INFECTED CYST OF SKIN: Primary | ICD-10-CM

## 2025-06-27 DIAGNOSIS — Z12.83 SCREENING EXAM FOR SKIN CANCER: ICD-10-CM

## 2025-06-27 PROCEDURE — 3074F SYST BP LT 130 MM HG: CPT

## 2025-06-27 PROCEDURE — 3079F DIAST BP 80-89 MM HG: CPT

## 2025-06-27 PROCEDURE — 99213 OFFICE O/P EST LOW 20 MIN: CPT

## 2025-06-27 RX ORDER — CEPHALEXIN 500 MG/1
500 CAPSULE ORAL 3 TIMES DAILY
Qty: 21 CAPSULE | Refills: 0 | Status: SHIPPED | OUTPATIENT
Start: 2025-06-27 | End: 2025-07-04

## 2025-06-27 NOTE — PROGRESS NOTES
HPI:   Jewell is a 39 yr. Old female here today for a skin lesion.  Patient reports there has been a bump on the top of her shoulder for some time, has become red and irritated over the past few days.  Denies fever, chills, n/v/d.         Current Medications[1]   Past Medical History[2]   Past Surgical History[3]   Family History[4]   Short Social Hx on File[5]      REVIEW OF SYSTEMS:   Review of Systems   Constitutional:  Negative for chills and fever.   Cardiovascular: Negative.    Gastrointestinal:  Negative for diarrhea, nausea and vomiting.   Skin:  Positive for color change (see hpi).       EXAM:   /80 (BP Location: Right arm, Patient Position: Sitting)   Pulse 85   Temp 97.5 °F (36.4 °C) (Temporal)   Resp 16   Wt 227 lb (103 kg)   SpO2 99%   BMI 34.52 kg/m²   Physical Exam  Vitals reviewed.   Constitutional:       General: She is not in acute distress.     Appearance: Normal appearance. She is not ill-appearing.   HENT:      Head: Atraumatic.      Mouth/Throat:      Mouth: Mucous membranes are moist.   Eyes:      Conjunctiva/sclera: Conjunctivae normal.   Cardiovascular:      Rate and Rhythm: Normal rate.   Pulmonary:      Effort: Pulmonary effort is normal.   Skin:     General: Skin is warm and dry.      Findings: Lesion (inflamed cyst) present.   Neurological:      Mental Status: She is alert and oriented to person, place, and time.         ASSESSMENT AND PLAN:   Diagnoses and all orders for this visit:    Infected cyst of skin  -     cephALEXin 500 MG Oral Cap; Take 1 capsule (500 mg total) by mouth 3 (three) times daily for 7 days.    Screening exam for skin cancer  -     Derm Referral - External    -Medication sent to pharmacy.  Recommend warm moist compress application. Referral as above.  -Return for persistent or worsening symptoms, call with questions.  -Patient verbalized understanding and in agreement with plan.    Liane Ernandez, ROCK          [1]   Current Outpatient Medications    Medication Sig Dispense Refill    cephALEXin 500 MG Oral Cap Take 1 capsule (500 mg total) by mouth 3 (three) times daily for 7 days. 21 capsule 0    RYBELSUS 14 MG Oral Tab Take 1 tablet by mouth daily.      Glucose Blood (ACCU-CHEK SMARTVIEW) In Vitro Strip 1 strip by In Vitro route 2 (two) times daily.      Glucose Blood (ACCU-CHEK SMARTVIEW) In Vitro Strip 1 strip by Other route 2 (two) times daily.      TRULANCE 3 MG Oral Tab Take 1 tablet by mouth daily.      atorvastatin 20 MG Oral Tab TK 1 T PO D     [2]   Past Medical History:   Diabetes (HCC)    Left ACL tear    Type 2 diabetes mellitus (HCC)   [3]   Past Surgical History:  Procedure Laterality Date    Colonoscopy      Other      ; D & C procedure    Other      L knee ACL/meniscus repair    [4]   Family History  Problem Relation Age of Onset    Hypertension Mother     Heart Disease Other         Grandfather    Hypertension Other         Grandfather    Diabetes Other    [5]   Social History  Socioeconomic History    Marital status:    Occupational History    Occupation: Teacher   Tobacco Use    Smoking status: Never    Smokeless tobacco: Never   Vaping Use    Vaping status: Never Used   Substance and Sexual Activity    Alcohol use: No     Comment: occ    Drug use: No   Other Topics Concern    Caffeine Concern No    Exercise No    Seat Belt Yes     Comment: 100%     Social Drivers of Health      Received from [facility]    Housing Stability

## 2025-07-01 ENCOUNTER — PATIENT MESSAGE (OUTPATIENT)
Dept: FAMILY MEDICINE CLINIC | Facility: CLINIC | Age: 40
End: 2025-07-01

## 2025-07-02 NOTE — TELEPHONE ENCOUNTER
Recommend patient refer to dermatology for further evaluation and management as discussed in office visit. Follow up office visit for worsening redness, pus drainage, warmth to touch, fever, or chills.

## (undated) NOTE — LETTER
[facility] [place]  [address] [address]  Dept: [phone]  Dept Fax: [phone]      February 21, 2018    Patient: Ivy Valdez   Date of Visit: 2/21/2018       To Whom It May Concern:    Anjum Daysi was seen and treat

## (undated) NOTE — LETTER
Date: 2/4/2020    Patient Name: Froylan Wu          To Whom it may concern: This letter has been written at the patient's request. The above patient was seen at the [place] [facility] for treatment of a medical condition.      This patient kwame

## (undated) NOTE — ED AVS SNAPSHOT
THE [facility] in [facility] [address] [address]    Phone:  [phone]    Fax:  Ruthnichoamrit   MRN: [id number]    Department:  THE [facility] in [facility]   Date of Visit:  5/17/2017           Diag Apply heat to the site of the abdomen. Take Tylenol for pain control. Proceed to the emergency room with symptoms of worsening. Recheck with PCP in the next 2-3 days.     Discharge References/Attachments     ABDOMINAL PAIN, ADULT (ENGLISH)      Param a detailed feedback survey mailed to them a week after the visit. If you receive this, we would really appreciate it if you could take the time to complete it. Thank you! You were examined and treated today on an urgent basis only.   This was not a sosa [address] [facility] ([address]) [facility] [address] [facility]. ([address]) [facility] [address] ([address] [facility]  Soniya Villaseñor ([facility] 116 contrast coronal MPR imaging was performed. Dose reduction techniques were used. Dose information is   transmitted to the [employer] [facility]) [facility] ([address]) which includes the Dose Index Registry.      PATIENT STATE

## (undated) NOTE — LETTER
25    Patient: Jewell Colmenares  : 1985 Visit date: 2025    Dear  Camille Torres DO    Thank you for referring Jewell Colmenares to my practice.  Please find my assessment and plan below.     Assessment   1. Constipation, slow transit          Plan     The patient presents for opinion regarding chronic slow transit constipation.    The patient has had extensive GI workup including colonoscopy which was normal.    The patient is on Rybelsus and Trulance.    The patient does not routinely achieve 20 g of fiber daily intake.    I had a lengthy discussion with the patient regarding dietary, activity, exercise and lifestyle recommendations.  I discussed the importance of dietary fiber and intake and judicious use of MiraLAX to help augment bowel function.    Furthermore, I recommend the patient return to her gastroenterologist or seek another independent opinion for what appears to be functional slow transit and perhaps IBS-C.    The patient is to return to my attention on as-needed basis.    Patient voiced understanding of the care plan as discussed.    The patient was provided ample opportunity to ask questions.  All of the patient's questions were answered in detail.  The patient voiced understanding of the care plan.      Sincerely,       Wilmer Gardner MD   CC: No Recipients